# Patient Record
Sex: FEMALE | Race: WHITE | HISPANIC OR LATINO
[De-identification: names, ages, dates, MRNs, and addresses within clinical notes are randomized per-mention and may not be internally consistent; named-entity substitution may affect disease eponyms.]

---

## 2017-07-25 ENCOUNTER — CHARTING TRANS (OUTPATIENT)
Dept: OTHER | Age: 37
End: 2017-07-25

## 2017-08-12 ENCOUNTER — LAB SERVICES (OUTPATIENT)
Dept: OTHER | Age: 37
End: 2017-08-12

## 2017-08-12 ENCOUNTER — CHARTING TRANS (OUTPATIENT)
Dept: FAMILY MEDICINE | Age: 37
End: 2017-08-12

## 2017-08-12 LAB
ALBUMIN SERPL BCG-MCNC: 4.4 G/DL (ref 3.6–5.1)
ALP SERPL-CCNC: 84 U/L (ref 45–105)
ALT SERPL W/O P-5'-P-CCNC: 37 U/L (ref 15–43)
AST SERPL-CCNC: 26 U/L (ref 14–43)
BASOPHIL %: 0.4 % (ref 0–1.2)
BASOPHIL ABSOLUTE #: 0 10*3/UL (ref 0–0.1)
BILIRUB SERPL-MCNC: 0.8 MG/DL (ref 0–1.3)
BILIRUBIN URINE: NEGATIVE
BLOOD URINE: ABNORMAL
BUN SERPL-MCNC: 12 MG/DL (ref 7–20)
CALCIUM SERPL-MCNC: 9.2 MG/DL (ref 8.6–10.6)
CHLORIDE SERPL-SCNC: 99 MMOL/L (ref 96–107)
CHOLEST SERPL-MCNC: 145 MG/DL (ref 140–200)
CLARITY: CLEAR
COLOR: YELLOW
CREATININE, SERUM: 0.7 MG/DL (ref 0.5–1.4)
DIFFERENTIAL TYPE: NORMAL
EOSINOPHIL %: 1.9 % (ref 0–10)
EOSINOPHIL ABSOLUTE #: 0.2 10*3/UL (ref 0–0.5)
FOLLICLE STIMULATING: 26.8 M[IU]/ML (ref 2–25)
GFR SERPL CREATININE-BSD FRML MDRD: >60 ML/MIN/{1.73M2}
GFR SERPL CREATININE-BSD FRML MDRD: >60 ML/MIN/{1.73M2}
GLUCOSE P FAST SERPL-MCNC: 104 MG/DL (ref 60–100)
GLUCOSE QUALITATIVE U: NEGATIVE
HCO3 SERPL-SCNC: 29 MMOL/L (ref 22–32)
HDLC SERPL-MCNC: 36 MG/DL
HEMATOCRIT: 41.1 % (ref 34–45)
HEMOGLOBIN: 14 G/DL (ref 11.2–15.7)
KETONES, URINE: NEGATIVE
LDLC SERPL CALC-MCNC: 88 MG/DL (ref 30–100)
LEUKOCYTE ESTERASE URINE: NEGATIVE
LYMPH PERCENT: 24.3 % (ref 20.5–51.1)
LYMPHOCYTE ABSOLUTE #: 2.2 10*3/UL (ref 1.2–3.4)
MEAN CORPUSCULAR HGB CONCENTRATION: 34.1 % (ref 32–36)
MEAN CORPUSCULAR HGB: 30.8 PG (ref 27–34)
MEAN CORPUSCULAR VOLUME: 90.5 FL (ref 79–95)
MEAN PLATELET VOLUME: 11.8 FL (ref 8.6–12.4)
MONOCYTE ABSOLUTE #: 0.7 10*3/UL (ref 0.2–0.9)
MONOCYTE PERCENT: 7.3 % (ref 4.3–12.9)
NEUTROPHIL ABSOLUTE #: 6 10*3/UL (ref 1.4–6.5)
NEUTROPHIL PERCENT: 66.1 % (ref 34–73.5)
NITRITE URINE: NEGATIVE
PH URINE: 6 (ref 5–7)
PLATELET COUNT: 252 10*3/UL (ref 150–400)
POTASSIUM SERPL-SCNC: 4 MMOL/L (ref 3.5–5.3)
PROLACTIN SERPL-MCNC: 12.1 NG/ML (ref 3–19)
PROT SERPL-MCNC: 8.3 G/DL (ref 6.4–8.5)
RED BLOOD CELL COUNT: 4.54 10*6/UL (ref 3.7–5.2)
RED CELL DISTRIBUTION WIDTH: 13.4 % (ref 11.3–14.8)
SODIUM SERPL-SCNC: 140 MMOL/L (ref 136–146)
SPECIFIC GRAVITY URINE: 1.01 (ref 1–1.03)
TRIGL SERPL-MCNC: 104 MG/DL (ref 0–200)
TSH SERPL DL<=0.05 MIU/L-ACNC: 1.09 M[IU]/L (ref 0.3–4.82)
URINE PROTEIN, QUAL (DIPSTICK): NEGATIVE
UROBILINOGEN URINE: <2
WHITE BLOOD CELL COUNT: 9 10*3/UL (ref 4–10)

## 2017-08-18 ENCOUNTER — CHARTING TRANS (OUTPATIENT)
Dept: FAMILY MEDICINE | Age: 37
End: 2017-08-18

## 2017-08-18 ENCOUNTER — CHARTING TRANS (OUTPATIENT)
Dept: OTHER | Age: 37
End: 2017-08-18

## 2017-09-08 ENCOUNTER — IMAGING SERVICES (OUTPATIENT)
Dept: OTHER | Age: 37
End: 2017-09-08

## 2017-09-14 ENCOUNTER — CHARTING TRANS (OUTPATIENT)
Dept: OTHER | Age: 37
End: 2017-09-14

## 2018-09-06 ENCOUNTER — CHARTING TRANS (OUTPATIENT)
Dept: OTHER | Age: 38
End: 2018-09-06

## 2018-09-07 ENCOUNTER — CHARTING TRANS (OUTPATIENT)
Dept: OTHER | Age: 38
End: 2018-09-07

## 2018-09-14 ENCOUNTER — IMAGING SERVICES (OUTPATIENT)
Dept: OTHER | Age: 38
End: 2018-09-14

## 2018-09-14 ENCOUNTER — CHARTING TRANS (OUTPATIENT)
Dept: OTHER | Age: 38
End: 2018-09-14

## 2018-09-17 ENCOUNTER — MYAURORA ACCOUNT LINK (OUTPATIENT)
Dept: OTHER | Age: 38
End: 2018-09-17

## 2018-09-17 ENCOUNTER — ANCILLARY ORDERS (OUTPATIENT)
Dept: OTHER | Age: 38
End: 2018-09-17

## 2018-09-17 ENCOUNTER — CHARTING TRANS (OUTPATIENT)
Dept: OTHER | Age: 38
End: 2018-09-17

## 2018-09-17 DIAGNOSIS — R92.8 OTHER ABNORMAL AND INCONCLUSIVE FINDINGS ON DIAGNOSTIC IMAGING OF BREAST: ICD-10-CM

## 2018-10-03 ENCOUNTER — CHARTING TRANS (OUTPATIENT)
Dept: OTHER | Age: 38
End: 2018-10-03

## 2018-10-03 ENCOUNTER — MYAURORA ACCOUNT LINK (OUTPATIENT)
Dept: OTHER | Age: 38
End: 2018-10-03

## 2018-10-03 ASSESSMENT — PAIN SCALES - GENERAL: PAINLEVEL_OUTOF10: 0

## 2018-11-03 VITALS
OXYGEN SATURATION: 97 % | BODY MASS INDEX: 42.59 KG/M2 | HEIGHT: 66 IN | TEMPERATURE: 98.3 F | RESPIRATION RATE: 16 BRPM | SYSTOLIC BLOOD PRESSURE: 122 MMHG | DIASTOLIC BLOOD PRESSURE: 80 MMHG | WEIGHT: 264.99 LBS | HEART RATE: 88 BPM

## 2018-11-28 VITALS
BODY MASS INDEX: 41 KG/M2 | SYSTOLIC BLOOD PRESSURE: 122 MMHG | DIASTOLIC BLOOD PRESSURE: 70 MMHG | HEART RATE: 68 BPM | RESPIRATION RATE: 16 BRPM | WEIGHT: 254 LBS | TEMPERATURE: 97.5 F

## 2018-11-28 VITALS
TEMPERATURE: 96.2 F | DIASTOLIC BLOOD PRESSURE: 90 MMHG | RESPIRATION RATE: 20 BRPM | WEIGHT: 257 LBS | SYSTOLIC BLOOD PRESSURE: 132 MMHG | BODY MASS INDEX: 41.3 KG/M2 | HEIGHT: 66 IN | HEART RATE: 76 BPM | OXYGEN SATURATION: 99 %

## 2018-12-03 ENCOUNTER — IMAGING SERVICES (OUTPATIENT)
Dept: MAMMOGRAPHY | Age: 38
End: 2018-12-03

## 2018-12-03 DIAGNOSIS — R92.8 OTHER ABNORMAL AND INCONCLUSIVE FINDINGS ON DIAGNOSTIC IMAGING OF BREAST: ICD-10-CM

## 2019-02-12 RX ORDER — OLMESARTAN MEDOXOMIL AND HYDROCHLOROTHIAZIDE 20/12.5 20; 12.5 MG/1; MG/1
TABLET ORAL
COMMUNITY
End: 2019-10-27 | Stop reason: SDUPTHER

## 2019-02-12 RX ORDER — ALBUTEROL SULFATE 90 UG/1
AEROSOL, METERED RESPIRATORY (INHALATION)
COMMUNITY
Start: 2018-10-09 | End: 2019-03-19 | Stop reason: ALTCHOICE

## 2019-02-12 RX ORDER — PREDNISONE 20 MG/1
TABLET ORAL
COMMUNITY
Start: 2018-10-09 | End: 2019-03-19 | Stop reason: ALTCHOICE

## 2019-02-12 RX ORDER — BENZONATATE 100 MG/1
CAPSULE ORAL
COMMUNITY
Start: 2018-10-09 | End: 2019-03-19 | Stop reason: ALTCHOICE

## 2019-02-12 RX ORDER — DOXYCYCLINE HYCLATE 100 MG
TABLET ORAL
COMMUNITY
Start: 2018-10-09 | End: 2019-03-19 | Stop reason: ALTCHOICE

## 2019-03-01 ENCOUNTER — IMAGING SERVICES (OUTPATIENT)
Dept: MAMMOGRAPHY | Age: 39
End: 2019-03-01

## 2019-03-01 PROCEDURE — 77066 DX MAMMO INCL CAD BI: CPT | Performed by: RADIOLOGY

## 2019-03-01 PROCEDURE — 77062 BREAST TOMOSYNTHESIS BI: CPT | Performed by: RADIOLOGY

## 2019-03-05 VITALS
DIASTOLIC BLOOD PRESSURE: 86 MMHG | HEIGHT: 66 IN | HEART RATE: 72 BPM | TEMPERATURE: 97.6 F | RESPIRATION RATE: 16 BRPM | BODY MASS INDEX: 41.46 KG/M2 | WEIGHT: 258 LBS | SYSTOLIC BLOOD PRESSURE: 128 MMHG

## 2019-03-19 ENCOUNTER — OFFICE VISIT (OUTPATIENT)
Dept: SURGERY | Age: 39
End: 2019-03-19

## 2019-03-19 VITALS — BODY MASS INDEX: 42.59 KG/M2 | WEIGHT: 265 LBS | HEIGHT: 66 IN

## 2019-03-19 DIAGNOSIS — R92.8 ABNORMAL MAMMOGRAM: Primary | ICD-10-CM

## 2019-03-19 PROCEDURE — 99212 OFFICE O/P EST SF 10 MIN: CPT | Performed by: NURSE PRACTITIONER

## 2019-03-19 RX ORDER — OLMESARTAN MEDOXOMIL AND HYDROCHLOROTHIAZIDE 20/12.5 20; 12.5 MG/1; MG/1
TABLET ORAL
COMMUNITY
Start: 2017-08-12 | End: 2019-03-19 | Stop reason: SDUPTHER

## 2019-09-06 ENCOUNTER — OFFICE VISIT (OUTPATIENT)
Dept: FAMILY MEDICINE | Age: 39
End: 2019-09-06

## 2019-09-06 ENCOUNTER — LAB SERVICES (OUTPATIENT)
Dept: FAMILY MEDICINE | Age: 39
End: 2019-09-06

## 2019-09-06 ENCOUNTER — PATIENT OUTREACH (OUTPATIENT)
Dept: FAMILY MEDICINE CLINIC | Facility: CLINIC | Age: 39
End: 2019-09-06

## 2019-09-06 VITALS
TEMPERATURE: 97.8 F | HEART RATE: 74 BPM | DIASTOLIC BLOOD PRESSURE: 90 MMHG | BODY MASS INDEX: 43.55 KG/M2 | RESPIRATION RATE: 18 BRPM | SYSTOLIC BLOOD PRESSURE: 116 MMHG | HEIGHT: 66 IN | WEIGHT: 271 LBS

## 2019-09-06 DIAGNOSIS — R35.89 POLYURIA: Primary | ICD-10-CM

## 2019-09-06 DIAGNOSIS — L02.92 RECURRENT BOILS: ICD-10-CM

## 2019-09-06 DIAGNOSIS — R35.89 POLYURIA: ICD-10-CM

## 2019-09-06 DIAGNOSIS — R63.1 POLYDIPSIA: ICD-10-CM

## 2019-09-06 LAB
ALBUMIN SERPL-MCNC: 3.6 G/DL (ref 3.6–5.1)
ALP SERPL-CCNC: 74 U/L (ref 45–130)
ALT SERPL W/O P-5'-P-CCNC: 27 U/L (ref 4–38)
AST SERPL-CCNC: 22 U/L (ref 14–43)
BASOPHIL %: 0.3 % (ref 0–1.2)
BASOPHIL ABSOLUTE #: 0 10*3/UL (ref 0–0.1)
BILIRUB SERPL-MCNC: 0.4 MG/DL (ref 0–1.3)
BILIRUBIN URINE: NEGATIVE
BLOOD URINE: ABNORMAL
BUN SERPL-MCNC: 14 MG/DL (ref 7–20)
CALCIUM SERPL-MCNC: 8.7 MG/DL (ref 8.6–10.6)
CHLORIDE SERPL-SCNC: 105 MMOL/L (ref 96–107)
CHOLEST SERPL-MCNC: 127 MG/DL (ref 140–200)
CLARITY: ABNORMAL
CO2 SERPL-SCNC: 29 MMOL/L (ref 22–32)
COLOR: YELLOW
CREAT SERPL-MCNC: 0.7 MG/DL (ref 0.5–1.4)
DIFFERENTIAL TYPE: ABNORMAL
EOSINOPHIL %: 1.5 % (ref 0–10)
EOSINOPHIL ABSOLUTE #: 0.1 10*3/UL (ref 0–0.5)
EST. AVERAGE GLUCOSE BLD GHB EST-MCNC: 151 MG/DL (ref 0–154)
GFR SERPL CREATININE-BSD FRML MDRD: >60 ML/MIN/{1.73M2}
GFR SERPL CREATININE-BSD FRML MDRD: >60 ML/MIN/{1.73M2}
GLUCOSE P FAST SERPL-MCNC: 123 MG/DL (ref 60–100)
GLUCOSE QUALITATIVE U: NEGATIVE
HBA1C MFR BLD: 6.9 % (ref 4.2–6)
HDLC SERPL-MCNC: 37 MG/DL
HEMATOCRIT: 42.8 % (ref 34–45)
HEMOGLOBIN: 13.1 G/DL (ref 11.2–15.7)
KETONES, URINE: NEGATIVE
LDLC SERPL CALC-MCNC: 65 MG/DL (ref 30–100)
LEUKOCYTE ESTERASE URINE: ABNORMAL
LYMPH PERCENT: 29.1 % (ref 20.5–51.1)
LYMPHOCYTE ABSOLUTE #: 2.3 10*3/UL (ref 1.2–3.4)
MEAN CORPUSCULAR HGB CONCENTRATION: 30.6 % (ref 32–36)
MEAN CORPUSCULAR HGB: 28.8 PG (ref 27–34)
MEAN CORPUSCULAR VOLUME: 94.1 FL (ref 79–95)
MEAN PLATELET VOLUME: 11.9 FL (ref 8.6–12.4)
MONOCYTE ABSOLUTE #: 0.5 10*3/UL (ref 0.2–0.9)
MONOCYTE PERCENT: 6.6 % (ref 4.3–12.9)
MUCOUS: ABNORMAL
NEUTROPHIL ABSOLUTE #: 4.9 10*3/UL (ref 1.4–6.5)
NEUTROPHIL PERCENT: 62.5 % (ref 34–73.5)
NITRITE URINE: NEGATIVE
PH URINE: 6 (ref 5–7)
PLATELET COUNT: 229 10*3/UL (ref 150–400)
POTASSIUM SERPL-SCNC: 4.5 MMOL/L (ref 3.5–5.3)
PROT SERPL-MCNC: 7.1 G/DL (ref 6.4–8.5)
RED BLOOD CELL COUNT: 4.55 10*6/UL (ref 3.7–5.2)
RED BLOOD CELLS URINE: ABNORMAL (ref 0–3)
RED CELL DISTRIBUTION WIDTH: 13.2 % (ref 11.3–14.8)
SODIUM SERPL-SCNC: 142 MMOL/L (ref 136–146)
SPECIFIC GRAVITY URINE: 1.03 (ref 1–1.03)
SQUAMOUS EPITHELIAL CELLS: ABNORMAL
TRIGL SERPL-MCNC: 126 MG/DL (ref 0–200)
URINE PROTEIN, QUAL (DIPSTICK): NEGATIVE
UROBILINOGEN URINE: <2
WHITE BLOOD CELL COUNT: 7.9 10*3/UL (ref 4–10)
WHITE BLOOD CELLS URINE: ABNORMAL (ref 0–5)

## 2019-09-06 PROCEDURE — 99214 OFFICE O/P EST MOD 30 MIN: CPT | Performed by: PHYSICIAN ASSISTANT

## 2019-09-06 PROCEDURE — 85025 COMPLETE CBC W/AUTO DIFF WBC: CPT | Performed by: PHYSICIAN ASSISTANT

## 2019-09-06 PROCEDURE — 80061 LIPID PANEL: CPT | Performed by: PHYSICIAN ASSISTANT

## 2019-09-06 PROCEDURE — 87086 URINE CULTURE/COLONY COUNT: CPT | Performed by: PHYSICIAN ASSISTANT

## 2019-09-06 PROCEDURE — 80053 COMPREHEN METABOLIC PANEL: CPT | Performed by: PHYSICIAN ASSISTANT

## 2019-09-06 PROCEDURE — 83036 HEMOGLOBIN GLYCOSYLATED A1C: CPT | Performed by: PHYSICIAN ASSISTANT

## 2019-09-06 PROCEDURE — 81001 URINALYSIS AUTO W/SCOPE: CPT | Performed by: PHYSICIAN ASSISTANT

## 2019-09-06 PROCEDURE — 36415 COLL VENOUS BLD VENIPUNCTURE: CPT | Performed by: PHYSICIAN ASSISTANT

## 2019-09-06 RX ORDER — CEFADROXIL 1000 MG/1
1 TABLET ORAL 2 TIMES DAILY
Qty: 28 TABLET | Refills: 0 | Status: SHIPPED | OUTPATIENT
Start: 2019-09-06 | End: 2020-01-25 | Stop reason: ALTCHOICE

## 2019-09-07 ENCOUNTER — E-ADVICE (OUTPATIENT)
Dept: FAMILY MEDICINE | Age: 39
End: 2019-09-07

## 2019-09-07 ENCOUNTER — TELEPHONE (OUTPATIENT)
Dept: FAMILY MEDICINE | Age: 39
End: 2019-09-07

## 2019-09-07 LAB — FINAL REPORT: NORMAL

## 2019-09-07 RX ORDER — METFORMIN HYDROCHLORIDE 500 MG/1
500 TABLET, EXTENDED RELEASE ORAL
Qty: 30 TABLET | Refills: 1 | Status: SHIPPED | OUTPATIENT
Start: 2019-09-07 | End: 2019-10-27 | Stop reason: SDUPTHER

## 2019-09-09 ENCOUNTER — OFFICE VISIT (OUTPATIENT)
Dept: FAMILY MEDICINE | Age: 39
End: 2019-09-09

## 2019-09-09 VITALS
DIASTOLIC BLOOD PRESSURE: 82 MMHG | SYSTOLIC BLOOD PRESSURE: 112 MMHG | HEART RATE: 84 BPM | BODY MASS INDEX: 42.59 KG/M2 | WEIGHT: 265 LBS | RESPIRATION RATE: 16 BRPM | TEMPERATURE: 97.8 F | HEIGHT: 66 IN

## 2019-09-09 DIAGNOSIS — E11.9 TYPE 2 DIABETES MELLITUS WITHOUT COMPLICATION, WITHOUT LONG-TERM CURRENT USE OF INSULIN (CMD): Primary | ICD-10-CM

## 2019-09-09 PROCEDURE — 3074F SYST BP LT 130 MM HG: CPT | Performed by: PHYSICIAN ASSISTANT

## 2019-09-09 PROCEDURE — 3079F DIAST BP 80-89 MM HG: CPT | Performed by: PHYSICIAN ASSISTANT

## 2019-09-09 PROCEDURE — 99214 OFFICE O/P EST MOD 30 MIN: CPT | Performed by: PHYSICIAN ASSISTANT

## 2019-09-14 ASSESSMENT — ENCOUNTER SYMPTOMS
RESPIRATORY NEGATIVE: 1
DIZZINESS: 1
CONSTITUTIONAL NEGATIVE: 1

## 2019-09-15 ASSESSMENT — ENCOUNTER SYMPTOMS
RESPIRATORY NEGATIVE: 1
CONSTITUTIONAL NEGATIVE: 1

## 2019-10-01 ENCOUNTER — E-ADVICE (OUTPATIENT)
Dept: SURGERY | Age: 39
End: 2019-10-01

## 2019-10-05 ENCOUNTER — OFFICE VISIT (OUTPATIENT)
Dept: FAMILY MEDICINE | Age: 39
End: 2019-10-05

## 2019-10-05 VITALS
WEIGHT: 256 LBS | TEMPERATURE: 97.2 F | BODY MASS INDEX: 41.14 KG/M2 | SYSTOLIC BLOOD PRESSURE: 128 MMHG | HEIGHT: 66 IN | RESPIRATION RATE: 16 BRPM | HEART RATE: 74 BPM | DIASTOLIC BLOOD PRESSURE: 84 MMHG | OXYGEN SATURATION: 97 %

## 2019-10-05 DIAGNOSIS — E11.9 TYPE 2 DIABETES MELLITUS WITHOUT COMPLICATION, WITHOUT LONG-TERM CURRENT USE OF INSULIN (CMD): Primary | ICD-10-CM

## 2019-10-05 DIAGNOSIS — Z23 NEED FOR PNEUMOCOCCAL VACCINATION: ICD-10-CM

## 2019-10-05 DIAGNOSIS — Z23 NEED FOR INFLUENZA VACCINATION: ICD-10-CM

## 2019-10-05 PROCEDURE — 3079F DIAST BP 80-89 MM HG: CPT | Performed by: PHYSICIAN ASSISTANT

## 2019-10-05 PROCEDURE — 3074F SYST BP LT 130 MM HG: CPT | Performed by: PHYSICIAN ASSISTANT

## 2019-10-05 PROCEDURE — 99214 OFFICE O/P EST MOD 30 MIN: CPT | Performed by: PHYSICIAN ASSISTANT

## 2019-10-05 PROCEDURE — 90471 IMMUNIZATION ADMIN: CPT

## 2019-10-05 PROCEDURE — 90732 PPSV23 VACC 2 YRS+ SUBQ/IM: CPT

## 2019-10-05 PROCEDURE — 90686 IIV4 VACC NO PRSV 0.5 ML IM: CPT

## 2019-10-05 PROCEDURE — 90472 IMMUNIZATION ADMIN EACH ADD: CPT

## 2019-10-12 ENCOUNTER — OFFICE VISIT (OUTPATIENT)
Dept: NUTRITION | Age: 39
End: 2019-10-12

## 2019-10-12 DIAGNOSIS — E11.9 TYPE 2 DIABETES MELLITUS WITHOUT COMPLICATION, WITHOUT LONG-TERM CURRENT USE OF INSULIN (CMD): Primary | ICD-10-CM

## 2019-10-12 PROCEDURE — 97802 MEDICAL NUTRITION INDIV IN: CPT | Performed by: DIETITIAN, REGISTERED

## 2019-10-28 ENCOUNTER — OFFICE VISIT (OUTPATIENT)
Dept: OPTOMETRY | Age: 39
End: 2019-10-28
Attending: PHYSICIAN ASSISTANT

## 2019-10-28 ENCOUNTER — IMAGING SERVICES (OUTPATIENT)
Dept: MAMMOGRAPHY | Age: 39
End: 2019-10-28
Attending: NURSE PRACTITIONER

## 2019-10-28 DIAGNOSIS — R92.8 ABNORMAL MAMMOGRAM: ICD-10-CM

## 2019-10-28 DIAGNOSIS — R92.8 ABNORMAL MAMMOGRAM: Primary | ICD-10-CM

## 2019-10-28 DIAGNOSIS — E11.9 TYPE 2 DIABETES MELLITUS WITHOUT RETINOPATHY (CMD): Primary | ICD-10-CM

## 2019-10-28 PROCEDURE — G0279 TOMOSYNTHESIS, MAMMO: HCPCS | Performed by: RADIOLOGY

## 2019-10-28 PROCEDURE — 77061 BREAST TOMOSYNTHESIS UNI: CPT | Performed by: RADIOLOGY

## 2019-10-28 PROCEDURE — 77065 DX MAMMO INCL CAD UNI: CPT | Performed by: RADIOLOGY

## 2019-10-28 PROCEDURE — 99203 OFFICE O/P NEW LOW 30 MIN: CPT | Performed by: OPTOMETRIST

## 2019-10-28 RX ORDER — METFORMIN HYDROCHLORIDE 500 MG/1
TABLET, EXTENDED RELEASE ORAL
Qty: 30 TABLET | Refills: 1 | Status: SHIPPED | OUTPATIENT
Start: 2019-10-28 | End: 2020-01-18 | Stop reason: SDUPTHER

## 2019-10-28 RX ORDER — OLMESARTAN MEDOXOMIL AND HYDROCHLOROTHIAZIDE 20/12.5 20; 12.5 MG/1; MG/1
TABLET ORAL
Qty: 30 TABLET | Refills: 11 | Status: SHIPPED | OUTPATIENT
Start: 2019-10-28 | End: 2021-02-23 | Stop reason: SDUPTHER

## 2019-10-28 RX ORDER — LANCETS
EACH MISCELLANEOUS
Refills: 5 | COMMUNITY
Start: 2019-09-09

## 2019-10-28 ASSESSMENT — VISUAL ACUITY
OS_SC: J1+
OD_SC: J1+
OS_SC: 20/20
OD_SC: 20/20
METHOD: SNELLEN - LINEAR

## 2019-10-28 ASSESSMENT — EXTERNAL EXAM - RIGHT EYE: OD_EXAM: NORMAL

## 2019-10-28 ASSESSMENT — CONF VISUAL FIELD
OS_NORMAL: 1
OD_NORMAL: 1

## 2019-10-28 ASSESSMENT — SLIT LAMP EXAM - LIDS
COMMENTS: NORMAL
COMMENTS: NORMAL

## 2019-10-28 ASSESSMENT — EXTERNAL EXAM - LEFT EYE: OS_EXAM: NORMAL

## 2019-10-28 ASSESSMENT — CUP TO DISC RATIO
OD_RATIO: 0.2
OS_RATIO: 0.2

## 2019-10-29 ENCOUNTER — PHARMACIST CLINIC VISIT (OUTPATIENT)
Dept: CHRONIC DISEASE MANAGEMENT | Age: 39
End: 2019-10-29

## 2019-10-29 DIAGNOSIS — Z71.89 ENCOUNTER FOR DIABETES EDUCATION: Primary | ICD-10-CM

## 2019-10-29 PROCEDURE — 99211 OFF/OP EST MAY X REQ PHY/QHP: CPT

## 2019-12-10 ENCOUNTER — PATIENT OUTREACH (OUTPATIENT)
Dept: FAMILY MEDICINE CLINIC | Facility: CLINIC | Age: 39
End: 2019-12-10

## 2020-01-11 ENCOUNTER — APPOINTMENT (OUTPATIENT)
Dept: FAMILY MEDICINE | Age: 40
End: 2020-01-11

## 2020-01-20 RX ORDER — METFORMIN HYDROCHLORIDE 500 MG/1
TABLET, EXTENDED RELEASE ORAL
Qty: 30 TABLET | Refills: 0 | Status: SHIPPED | OUTPATIENT
Start: 2020-01-20 | End: 2020-01-25 | Stop reason: SDUPTHER

## 2020-01-25 ENCOUNTER — APPOINTMENT (OUTPATIENT)
Dept: FAMILY MEDICINE | Age: 40
End: 2020-01-25

## 2020-01-25 ENCOUNTER — OFFICE VISIT (OUTPATIENT)
Dept: FAMILY MEDICINE | Age: 40
End: 2020-01-25

## 2020-01-25 ENCOUNTER — LAB SERVICES (OUTPATIENT)
Dept: FAMILY MEDICINE | Age: 40
End: 2020-01-25

## 2020-01-25 VITALS
WEIGHT: 241 LBS | RESPIRATION RATE: 16 BRPM | HEIGHT: 66 IN | SYSTOLIC BLOOD PRESSURE: 108 MMHG | OXYGEN SATURATION: 98 % | TEMPERATURE: 97.8 F | DIASTOLIC BLOOD PRESSURE: 76 MMHG | HEART RATE: 70 BPM | BODY MASS INDEX: 38.73 KG/M2

## 2020-01-25 DIAGNOSIS — E11.9 TYPE 2 DIABETES MELLITUS WITHOUT COMPLICATION, WITHOUT LONG-TERM CURRENT USE OF INSULIN (CMD): ICD-10-CM

## 2020-01-25 DIAGNOSIS — Z00.01 ENCOUNTER FOR GENERAL ADULT MEDICAL EXAMINATION WITH ABNORMAL FINDINGS: Primary | ICD-10-CM

## 2020-01-25 DIAGNOSIS — Z12.39 BREAST CANCER SCREENING: ICD-10-CM

## 2020-01-25 DIAGNOSIS — Z11.51 SCREENING FOR HPV (HUMAN PAPILLOMAVIRUS): ICD-10-CM

## 2020-01-25 DIAGNOSIS — Z12.4 PAP SMEAR FOR CERVICAL CANCER SCREENING: ICD-10-CM

## 2020-01-25 LAB
ALBUMIN SERPL-MCNC: 4.6 G/DL (ref 3.6–5.1)
ALP SERPL-CCNC: 67 U/L (ref 45–130)
ALT SERPL W/O P-5'-P-CCNC: 30 U/L (ref 4–38)
AST SERPL-CCNC: 26 U/L (ref 14–43)
BILIRUB SERPL-MCNC: 0.6 MG/DL (ref 0–1.3)
BUN SERPL-MCNC: 18 MG/DL (ref 7–20)
CALCIUM SERPL-MCNC: 9.6 MG/DL (ref 8.6–10.6)
CHLORIDE SERPL-SCNC: 98 MMOL/L (ref 96–107)
CO2 SERPL-SCNC: 31 MMOL/L (ref 22–32)
CREAT SERPL-MCNC: 0.7 MG/DL (ref 0.5–1.4)
EST. AVERAGE GLUCOSE BLD GHB EST-MCNC: 130 MG/DL (ref 0–154)
GFR SERPL CREATININE-BSD FRML MDRD: >60 ML/MIN/{1.73M2}
GFR SERPL CREATININE-BSD FRML MDRD: >60 ML/MIN/{1.73M2}
GLUCOSE P FAST SERPL-MCNC: 101 MG/DL (ref 60–100)
HBA1C MFR BLD: 6.2 % (ref 4.2–6)
POTASSIUM SERPL-SCNC: 4.2 MMOL/L (ref 3.5–5.3)
PROT SERPL-MCNC: 8.2 G/DL (ref 6.4–8.5)
SODIUM SERPL-SCNC: 136 MMOL/L (ref 136–146)

## 2020-01-25 PROCEDURE — 83036 HEMOGLOBIN GLYCOSYLATED A1C: CPT | Performed by: PHYSICIAN ASSISTANT

## 2020-01-25 PROCEDURE — 99214 OFFICE O/P EST MOD 30 MIN: CPT | Performed by: PHYSICIAN ASSISTANT

## 2020-01-25 PROCEDURE — 88142 CYTOPATH C/V THIN LAYER: CPT | Performed by: PATHOLOGY

## 2020-01-25 PROCEDURE — 3074F SYST BP LT 130 MM HG: CPT | Performed by: PHYSICIAN ASSISTANT

## 2020-01-25 PROCEDURE — 99395 PREV VISIT EST AGE 18-39: CPT | Performed by: PHYSICIAN ASSISTANT

## 2020-01-25 PROCEDURE — 36415 COLL VENOUS BLD VENIPUNCTURE: CPT | Performed by: PHYSICIAN ASSISTANT

## 2020-01-25 PROCEDURE — 3078F DIAST BP <80 MM HG: CPT | Performed by: PHYSICIAN ASSISTANT

## 2020-01-25 PROCEDURE — X1094 NO CHARGE VISIT: HCPCS

## 2020-01-25 PROCEDURE — 80053 COMPREHEN METABOLIC PANEL: CPT | Performed by: PHYSICIAN ASSISTANT

## 2020-01-25 RX ORDER — METFORMIN HYDROCHLORIDE 500 MG/1
500 TABLET, EXTENDED RELEASE ORAL
Qty: 30 TABLET | Refills: 5 | Status: SHIPPED | OUTPATIENT
Start: 2020-01-25 | End: 2020-11-05

## 2020-01-25 RX ORDER — DROSPIRENONE AND ETHINYL ESTRADIOL 0.03MG-3MG
1 KIT ORAL DAILY
Qty: 84 TABLET | Refills: 4 | Status: SHIPPED | OUTPATIENT
Start: 2020-01-25 | End: 2020-06-05 | Stop reason: ALTCHOICE

## 2020-01-25 ASSESSMENT — ENCOUNTER SYMPTOMS
EYE ITCHING: 0
DIZZINESS: 0
ADENOPATHY: 0
NERVOUS/ANXIOUS: 1
AGITATION: 0
SORE THROAT: 0
DIAPHORESIS: 0
EYE DISCHARGE: 0
NUMBNESS: 0
FACIAL SWELLING: 0
FATIGUE: 0
FACIAL ASYMMETRY: 0
CHILLS: 0
ACTIVITY CHANGE: 0
CHEST TIGHTNESS: 0
SINUS PRESSURE: 0
FEVER: 0
CONFUSION: 0
EYE REDNESS: 0
APPETITE CHANGE: 0
NAUSEA: 0
EYE PAIN: 0
POLYPHAGIA: 0
RHINORRHEA: 0
POLYDIPSIA: 0
HEADACHES: 0
ABDOMINAL DISTENTION: 0
BRUISES/BLEEDS EASILY: 0
CONSTIPATION: 0
SHORTNESS OF BREATH: 0
VOMITING: 0
WHEEZING: 0
WEAKNESS: 0
COUGH: 0
COLOR CHANGE: 0
BLOOD IN STOOL: 0
ABDOMINAL PAIN: 0
DIARRHEA: 0
UNEXPECTED WEIGHT CHANGE: 0
SINUS PAIN: 0
LIGHT-HEADEDNESS: 0

## 2020-01-25 ASSESSMENT — PATIENT HEALTH QUESTIONNAIRE - PHQ9
2. FEELING DOWN, DEPRESSED OR HOPELESS: NOT AT ALL
1. LITTLE INTEREST OR PLEASURE IN DOING THINGS: NOT AT ALL
SUM OF ALL RESPONSES TO PHQ9 QUESTIONS 1 AND 2: 0
SUM OF ALL RESPONSES TO PHQ9 QUESTIONS 1 AND 2: 0

## 2020-01-30 LAB — AP REPORT: NORMAL

## 2020-02-04 LAB — HPV I/H RISK 4 DNA CVX QL NAA+PROBE: NORMAL

## 2020-06-02 ENCOUNTER — V-VISIT (OUTPATIENT)
Dept: FAMILY MEDICINE | Age: 40
End: 2020-06-02

## 2020-06-02 ENCOUNTER — E-ADVICE (OUTPATIENT)
Dept: FAMILY MEDICINE | Age: 40
End: 2020-06-02

## 2020-06-02 DIAGNOSIS — F41.9 ANXIETY: Primary | ICD-10-CM

## 2020-06-02 PROBLEM — I10 BENIGN ESSENTIAL HYPERTENSION: Status: ACTIVE | Noted: 2017-07-25

## 2020-06-02 PROBLEM — E11.9 TYPE 2 DIABETES MELLITUS (CMD): Status: ACTIVE | Noted: 2019-12-22

## 2020-06-02 PROCEDURE — 99214 OFFICE O/P EST MOD 30 MIN: CPT | Performed by: PHYSICIAN ASSISTANT

## 2020-06-02 RX ORDER — BUSPIRONE HYDROCHLORIDE 5 MG/1
5 TABLET ORAL 2 TIMES DAILY
Qty: 60 TABLET | Refills: 2 | Status: SHIPPED | OUTPATIENT
Start: 2020-06-02 | End: 2021-04-03 | Stop reason: ALTCHOICE

## 2020-06-02 RX ORDER — ALPRAZOLAM 0.25 MG/1
0.25 TABLET ORAL 3 TIMES DAILY PRN
Qty: 30 TABLET | Refills: 0 | Status: SHIPPED | OUTPATIENT
Start: 2020-06-02 | End: 2021-04-03 | Stop reason: ALTCHOICE

## 2020-06-02 ASSESSMENT — ENCOUNTER SYMPTOMS
CONSTITUTIONAL NEGATIVE: 1
RESPIRATORY NEGATIVE: 1
NERVOUS/ANXIOUS: 1

## 2020-06-05 ENCOUNTER — OFFICE VISIT (OUTPATIENT)
Dept: FAMILY MEDICINE | Age: 40
End: 2020-06-05

## 2020-06-05 ENCOUNTER — LAB SERVICES (OUTPATIENT)
Dept: FAMILY MEDICINE | Age: 40
End: 2020-06-05

## 2020-06-05 VITALS
HEIGHT: 66 IN | BODY MASS INDEX: 39.7 KG/M2 | HEART RATE: 76 BPM | DIASTOLIC BLOOD PRESSURE: 76 MMHG | RESPIRATION RATE: 18 BRPM | OXYGEN SATURATION: 97 % | SYSTOLIC BLOOD PRESSURE: 102 MMHG | TEMPERATURE: 97.4 F | WEIGHT: 247 LBS

## 2020-06-05 DIAGNOSIS — R00.2 HEART PALPITATIONS: Primary | ICD-10-CM

## 2020-06-05 DIAGNOSIS — R00.2 HEART PALPITATIONS: ICD-10-CM

## 2020-06-05 DIAGNOSIS — F41.9 ANXIETY: ICD-10-CM

## 2020-06-05 LAB — TSH SERPL DL<=0.05 MIU/L-ACNC: 1.66 M[IU]/L (ref 0.3–4.82)

## 2020-06-05 PROCEDURE — 99214 OFFICE O/P EST MOD 30 MIN: CPT | Performed by: PHYSICIAN ASSISTANT

## 2020-06-05 PROCEDURE — 3074F SYST BP LT 130 MM HG: CPT | Performed by: PHYSICIAN ASSISTANT

## 2020-06-05 PROCEDURE — 84443 ASSAY THYROID STIM HORMONE: CPT | Performed by: PHYSICIAN ASSISTANT

## 2020-06-05 PROCEDURE — 93000 ELECTROCARDIOGRAM COMPLETE: CPT | Performed by: PHYSICIAN ASSISTANT

## 2020-06-05 PROCEDURE — 3078F DIAST BP <80 MM HG: CPT | Performed by: PHYSICIAN ASSISTANT

## 2020-06-05 PROCEDURE — 36415 COLL VENOUS BLD VENIPUNCTURE: CPT | Performed by: PHYSICIAN ASSISTANT

## 2020-06-05 ASSESSMENT — PATIENT HEALTH QUESTIONNAIRE - PHQ9
CLINICAL INTERPRETATION OF PHQ2 SCORE: NO FURTHER SCREENING NEEDED
2. FEELING DOWN, DEPRESSED OR HOPELESS: NOT AT ALL
1. LITTLE INTEREST OR PLEASURE IN DOING THINGS: NOT AT ALL
SUM OF ALL RESPONSES TO PHQ9 QUESTIONS 1 AND 2: 0
SUM OF ALL RESPONSES TO PHQ9 QUESTIONS 1 AND 2: 0
CLINICAL INTERPRETATION OF PHQ9 SCORE: NO FURTHER SCREENING NEEDED

## 2020-06-05 ASSESSMENT — ENCOUNTER SYMPTOMS
RESPIRATORY NEGATIVE: 1
CONSTITUTIONAL NEGATIVE: 1
NERVOUS/ANXIOUS: 1

## 2020-07-24 ENCOUNTER — V-VISIT (OUTPATIENT)
Dept: FAMILY MEDICINE | Age: 40
End: 2020-07-24

## 2020-07-24 DIAGNOSIS — R00.2 HEART PALPITATIONS: ICD-10-CM

## 2020-07-24 DIAGNOSIS — F41.9 ANXIETY: Primary | ICD-10-CM

## 2020-07-24 DIAGNOSIS — R07.89 CHEST PRESSURE: ICD-10-CM

## 2020-07-24 PROCEDURE — 99214 OFFICE O/P EST MOD 30 MIN: CPT | Performed by: PHYSICIAN ASSISTANT

## 2020-07-24 ASSESSMENT — ENCOUNTER SYMPTOMS
CONSTITUTIONAL NEGATIVE: 1
RESPIRATORY NEGATIVE: 1

## 2020-07-25 ENCOUNTER — NURSE ONLY (OUTPATIENT)
Dept: FAMILY MEDICINE | Age: 40
End: 2020-07-25

## 2020-07-25 DIAGNOSIS — R07.89 CHEST PRESSURE: ICD-10-CM

## 2020-07-25 DIAGNOSIS — F41.9 ANXIETY: ICD-10-CM

## 2020-07-25 DIAGNOSIS — Z71.9 COUNSELED BY NURSE: Primary | ICD-10-CM

## 2020-07-25 DIAGNOSIS — R00.2 HEART PALPITATIONS: ICD-10-CM

## 2020-07-25 PROCEDURE — 93000 ELECTROCARDIOGRAM COMPLETE: CPT | Performed by: PHYSICIAN ASSISTANT

## 2020-08-12 ENCOUNTER — ANCILLARY PROCEDURE (OUTPATIENT)
Dept: CARDIOLOGY | Age: 40
End: 2020-08-12
Attending: PHYSICIAN ASSISTANT

## 2020-08-12 DIAGNOSIS — R00.2 HEART PALPITATIONS: ICD-10-CM

## 2020-08-12 PROCEDURE — 93224 XTRNL ECG REC UP TO 48 HRS: CPT | Performed by: INTERNAL MEDICINE

## 2020-08-25 ENCOUNTER — E-ADVICE (OUTPATIENT)
Dept: FAMILY MEDICINE | Age: 40
End: 2020-08-25

## 2020-10-22 ENCOUNTER — EXTERNAL RECORD (OUTPATIENT)
Dept: HEALTH INFORMATION MANAGEMENT | Facility: OTHER | Age: 40
End: 2020-10-22

## 2020-10-24 ENCOUNTER — EXTERNAL RECORD (OUTPATIENT)
Dept: HEALTH INFORMATION MANAGEMENT | Facility: OTHER | Age: 40
End: 2020-10-24

## 2020-11-05 RX ORDER — METFORMIN HYDROCHLORIDE 500 MG/1
TABLET, EXTENDED RELEASE ORAL
Qty: 30 TABLET | Refills: 3 | Status: SHIPPED | OUTPATIENT
Start: 2020-11-05 | End: 2021-02-23 | Stop reason: SDUPTHER

## 2021-02-23 ENCOUNTER — E-ADVICE (OUTPATIENT)
Dept: FAMILY MEDICINE | Age: 41
End: 2021-02-23

## 2021-02-23 DIAGNOSIS — E11.69 TYPE 2 DIABETES MELLITUS WITH OTHER SPECIFIED COMPLICATION, UNSPECIFIED WHETHER LONG TERM INSULIN USE (CMD): Primary | ICD-10-CM

## 2021-02-23 RX ORDER — OLMESARTAN MEDOXOMIL AND HYDROCHLOROTHIAZIDE 20/12.5 20; 12.5 MG/1; MG/1
1 TABLET ORAL DAILY
Qty: 90 TABLET | Refills: 1 | Status: SHIPPED | OUTPATIENT
Start: 2021-02-23 | End: 2021-08-10 | Stop reason: SDUPTHER

## 2021-02-23 RX ORDER — METFORMIN HYDROCHLORIDE 500 MG/1
500 TABLET, EXTENDED RELEASE ORAL
Qty: 90 TABLET | Refills: 1 | Status: SHIPPED | OUTPATIENT
Start: 2021-02-23 | End: 2021-04-03 | Stop reason: SDUPTHER

## 2021-03-29 LAB — HBA1C MFR BLD: 6.9 %

## 2021-04-03 ENCOUNTER — OFFICE VISIT (OUTPATIENT)
Dept: FAMILY MEDICINE | Age: 41
End: 2021-04-03

## 2021-04-03 VITALS
BODY MASS INDEX: 44.68 KG/M2 | WEIGHT: 278 LBS | TEMPERATURE: 97.6 F | HEIGHT: 66 IN | SYSTOLIC BLOOD PRESSURE: 132 MMHG | RESPIRATION RATE: 16 BRPM | OXYGEN SATURATION: 99 % | HEART RATE: 87 BPM | DIASTOLIC BLOOD PRESSURE: 88 MMHG

## 2021-04-03 DIAGNOSIS — Z12.31 VISIT FOR SCREENING MAMMOGRAM: ICD-10-CM

## 2021-04-03 DIAGNOSIS — E11.69 TYPE 2 DIABETES MELLITUS WITH OTHER SPECIFIED COMPLICATION, UNSPECIFIED WHETHER LONG TERM INSULIN USE (CMD): ICD-10-CM

## 2021-04-03 DIAGNOSIS — R10.33 PERIUMBILICAL ABDOMINAL PAIN: ICD-10-CM

## 2021-04-03 DIAGNOSIS — N64.4 BREAST PAIN: ICD-10-CM

## 2021-04-03 DIAGNOSIS — E66.01 CLASS 3 SEVERE OBESITY DUE TO EXCESS CALORIES WITH SERIOUS COMORBIDITY AND BODY MASS INDEX (BMI) OF 40.0 TO 44.9 IN ADULT (CMD): ICD-10-CM

## 2021-04-03 DIAGNOSIS — Z00.01 ENCOUNTER FOR GENERAL ADULT MEDICAL EXAMINATION WITH ABNORMAL FINDINGS: Primary | ICD-10-CM

## 2021-04-03 LAB
BILIRUBIN URINE: NEGATIVE
BLOOD URINE: ABNORMAL
CLARITY: ABNORMAL
COLOR: YELLOW
GLUCOSE QUALITATIVE U: NEGATIVE
KETONES, URINE: NEGATIVE
LEUKOCYTE ESTERASE URINE: ABNORMAL
MUCOUS: ABNORMAL
NITRITE URINE: NEGATIVE
PH URINE: 6 (ref 5–7)
RED BLOOD CELLS URINE: ABNORMAL (ref 0–3)
SPECIFIC GRAVITY URINE: 1.03 (ref 1–1.03)
SQUAMOUS EPITHELIAL CELLS: ABNORMAL
URINE PROTEIN, QUAL (DIPSTICK): NEGATIVE
UROBILINOGEN URINE: <2
WHITE BLOOD CELLS URINE: ABNORMAL (ref 0–5)

## 2021-04-03 PROCEDURE — 87086 URINE CULTURE/COLONY COUNT: CPT | Performed by: PHYSICIAN ASSISTANT

## 2021-04-03 PROCEDURE — 3075F SYST BP GE 130 - 139MM HG: CPT | Performed by: PHYSICIAN ASSISTANT

## 2021-04-03 PROCEDURE — 99213 OFFICE O/P EST LOW 20 MIN: CPT | Performed by: PHYSICIAN ASSISTANT

## 2021-04-03 PROCEDURE — 81003 URINALYSIS AUTO W/O SCOPE: CPT | Performed by: PHYSICIAN ASSISTANT

## 2021-04-03 PROCEDURE — 99395 PREV VISIT EST AGE 18-39: CPT | Performed by: PHYSICIAN ASSISTANT

## 2021-04-03 PROCEDURE — 3079F DIAST BP 80-89 MM HG: CPT | Performed by: PHYSICIAN ASSISTANT

## 2021-04-03 RX ORDER — METFORMIN HYDROCHLORIDE 500 MG/1
500 TABLET, EXTENDED RELEASE ORAL
Qty: 90 TABLET | Refills: 1 | Status: SHIPPED | OUTPATIENT
Start: 2021-04-03 | End: 2021-08-10 | Stop reason: SDUPTHER

## 2021-04-03 ASSESSMENT — ENCOUNTER SYMPTOMS
AGITATION: 0
NUMBNESS: 1
WHEEZING: 0
ACTIVITY CHANGE: 0
DIZZINESS: 0
SHORTNESS OF BREATH: 0
DIARRHEA: 0
SINUS PRESSURE: 0
FEVER: 0
HEADACHES: 0
RHINORRHEA: 0
BRUISES/BLEEDS EASILY: 0
WEAKNESS: 0
CONFUSION: 0
POLYPHAGIA: 0
EYE REDNESS: 0
BLOOD IN STOOL: 0
CHEST TIGHTNESS: 0
ABDOMINAL PAIN: 1
EYE ITCHING: 0
FATIGUE: 0
ADENOPATHY: 0
COLOR CHANGE: 0
SINUS PAIN: 0
NAUSEA: 0
VOMITING: 0
ABDOMINAL DISTENTION: 0
SORE THROAT: 0
FACIAL ASYMMETRY: 0
DIAPHORESIS: 0
CHILLS: 0
UNEXPECTED WEIGHT CHANGE: 0
EYE PAIN: 0
POLYDIPSIA: 0
COUGH: 0
CONSTIPATION: 0
FACIAL SWELLING: 0
LIGHT-HEADEDNESS: 0
EYE DISCHARGE: 0
APPETITE CHANGE: 0

## 2021-04-05 DIAGNOSIS — R82.90 ABNORMAL URINALYSIS: Primary | ICD-10-CM

## 2021-04-05 LAB — FINAL REPORT: NORMAL

## 2021-05-17 ENCOUNTER — DOCUMENTATION (OUTPATIENT)
Dept: FAMILY MEDICINE | Age: 41
End: 2021-05-17

## 2021-06-23 ENCOUNTER — E-ADVICE (OUTPATIENT)
Dept: FAMILY MEDICINE | Age: 41
End: 2021-06-23

## 2021-06-25 ENCOUNTER — APPOINTMENT (OUTPATIENT)
Dept: FAMILY MEDICINE | Age: 41
End: 2021-06-25

## 2021-06-25 ENCOUNTER — APPOINTMENT (OUTPATIENT)
Dept: MAMMOGRAPHY | Age: 41
End: 2021-06-25
Attending: PHYSICIAN ASSISTANT

## 2021-08-10 ENCOUNTER — LAB SERVICES (OUTPATIENT)
Dept: FAMILY MEDICINE | Age: 41
End: 2021-08-10

## 2021-08-10 ENCOUNTER — OFFICE VISIT (OUTPATIENT)
Dept: FAMILY MEDICINE | Age: 41
End: 2021-08-10

## 2021-08-10 ENCOUNTER — LAB REQUISITION (OUTPATIENT)
Dept: LAB | Age: 41
End: 2021-08-10

## 2021-08-10 VITALS
SYSTOLIC BLOOD PRESSURE: 112 MMHG | RESPIRATION RATE: 20 BRPM | HEART RATE: 76 BPM | BODY MASS INDEX: 43.39 KG/M2 | TEMPERATURE: 97.1 F | HEIGHT: 66 IN | DIASTOLIC BLOOD PRESSURE: 78 MMHG | WEIGHT: 270 LBS

## 2021-08-10 DIAGNOSIS — Z00.01 ENCOUNTER FOR GENERAL ADULT MEDICAL EXAMINATION WITH ABNORMAL FINDINGS: ICD-10-CM

## 2021-08-10 DIAGNOSIS — E11.9 TYPE 2 DIABETES MELLITUS WITHOUT COMPLICATION, WITHOUT LONG-TERM CURRENT USE OF INSULIN (CMD): ICD-10-CM

## 2021-08-10 DIAGNOSIS — Z00.01 ENCOUNTER FOR GENERAL ADULT MEDICAL EXAMINATION WITH ABNORMAL FINDINGS: Primary | ICD-10-CM

## 2021-08-10 DIAGNOSIS — Z11.1 SCREENING EXAMINATION FOR PULMONARY TUBERCULOSIS: ICD-10-CM

## 2021-08-10 DIAGNOSIS — Z01.84 IMMUNITY STATUS TESTING: ICD-10-CM

## 2021-08-10 DIAGNOSIS — Z01.84 ENCOUNTER FOR ANTIBODY RESPONSE EXAMINATION: ICD-10-CM

## 2021-08-10 DIAGNOSIS — N92.0 EXCESSIVE AND FREQUENT MENSTRUATION WITH REGULAR CYCLE: ICD-10-CM

## 2021-08-10 DIAGNOSIS — N92.0 EXCESSIVE AND FREQUENT MENSTRUATION: ICD-10-CM

## 2021-08-10 DIAGNOSIS — R30.0 DYSURIA: ICD-10-CM

## 2021-08-10 LAB
ALBUMIN SERPL-MCNC: 4.3 G/DL (ref 3.6–5.1)
ALP SERPL-CCNC: 81 U/L (ref 45–130)
ALT SERPL W/O P-5'-P-CCNC: 76 U/L (ref 4–38)
AST SERPL-CCNC: 82 U/L (ref 14–43)
BACTERIA: ABNORMAL
BASOPHIL %: 0.7 % (ref 0–1.2)
BASOPHIL ABSOLUTE #: 0.1 10*3/UL (ref 0–0.1)
BILIRUB SERPL-MCNC: 0.6 MG/DL (ref 0–1.3)
BILIRUBIN URINE: NEGATIVE
BLOOD URINE: ABNORMAL
BUN SERPL-MCNC: 16 MG/DL (ref 7–20)
CALCIUM SERPL-MCNC: 9.4 MG/DL (ref 8.6–10.6)
CHLORIDE SERPL-SCNC: 98 MMOL/L (ref 96–107)
CHOLEST SERPL-MCNC: 180 MG/DL (ref 140–200)
CLARITY: ABNORMAL
CO2 SERPL-SCNC: 28 MMOL/L (ref 22–32)
COLOR: YELLOW
CREAT SERPL-MCNC: 0.7 MG/DL (ref 0.5–1.4)
DIFFERENTIAL TYPE: ABNORMAL
EOSINOPHIL %: 1.8 % (ref 0–10)
EOSINOPHIL ABSOLUTE #: 0.2 10*3/UL (ref 0–0.5)
EST. AVERAGE GLUCOSE BLD GHB EST-MCNC: 155 MG/DL (ref 0–154)
GFR SERPL CREATININE-BSD FRML MDRD: >60 ML/MIN/{1.73M2}
GFR SERPL CREATININE-BSD FRML MDRD: >60 ML/MIN/{1.73M2}
GLUCOSE P FAST SERPL-MCNC: 170 MG/DL (ref 60–100)
GLUCOSE QUALITATIVE U: NEGATIVE
HBA1C MFR BLD: 7 % (ref 4.2–6)
HDLC SERPL-MCNC: 51 MG/DL
HEMATOCRIT: 46 % (ref 34–45)
HEMOGLOBIN: 14.2 G/DL (ref 11.2–15.7)
IMMATURE GRANULOCYTE ABSOLUTE: 0.04 10*3/UL (ref 0–0.05)
IMMATURE GRANULOCYTE PERCENT: 0.5 % (ref 0–0.5)
KETONES, URINE: NEGATIVE
LDLC SERPL CALC-MCNC: 101 MG/DL (ref 30–100)
LEUKOCYTE ESTERASE URINE: NEGATIVE
LYMPH PERCENT: 22.7 % (ref 20.5–51.1)
LYMPHOCYTE ABSOLUTE #: 2 10*3/UL (ref 1.2–3.4)
MEAN CORPUSCULAR HGB CONCENTRATION: 30.9 % (ref 32–36)
MEAN CORPUSCULAR HGB: 28.7 PG (ref 27–34)
MEAN CORPUSCULAR VOLUME: 93.1 FL (ref 79–95)
MEAN PLATELET VOLUME: 11.5 FL (ref 8.6–12.4)
MONOCYTE ABSOLUTE #: 0.5 10*3/UL (ref 0.2–0.9)
MONOCYTE PERCENT: 5.8 % (ref 4.3–12.9)
MUCOUS: ABNORMAL
NEUTROPHIL ABSOLUTE #: 6 10*3/UL (ref 1.4–6.5)
NEUTROPHIL PERCENT: 68.5 % (ref 34–73.5)
NITRITE URINE: NEGATIVE
PH URINE: 5 (ref 5–7)
PLATELET COUNT: 266 10*3/UL (ref 150–400)
POTASSIUM SERPL-SCNC: 4.5 MMOL/L (ref 3.5–5.3)
PROT SERPL-MCNC: 7.8 G/DL (ref 6.4–8.5)
RED BLOOD CELL COUNT: 4.94 10*6/UL (ref 3.7–5.2)
RED BLOOD CELLS URINE: ABNORMAL (ref 0–3)
RED CELL DISTRIBUTION WIDTH: 13.2 % (ref 11.3–14.8)
SODIUM SERPL-SCNC: 135 MMOL/L (ref 136–146)
SPECIFIC GRAVITY URINE: 1.02 (ref 1–1.03)
SQUAMOUS EPITHELIAL CELLS: ABNORMAL
TRIGL SERPL-MCNC: 139 MG/DL (ref 0–200)
TSH SERPL DL<=0.05 MIU/L-ACNC: 2.15 M[IU]/L (ref 0.3–4.82)
URINE PROTEIN, QUAL (DIPSTICK): NEGATIVE
UROBILINOGEN URINE: <2
WHITE BLOOD CELL COUNT: 8.7 10*3/UL (ref 4–10)
WHITE BLOOD CELLS URINE: ABNORMAL (ref 0–5)

## 2021-08-10 PROCEDURE — 99396 PREV VISIT EST AGE 40-64: CPT | Performed by: PHYSICIAN ASSISTANT

## 2021-08-10 PROCEDURE — 86787 VARICELLA-ZOSTER ANTIBODY: CPT | Performed by: CLINICAL MEDICAL LABORATORY

## 2021-08-10 PROCEDURE — 83036 HEMOGLOBIN GLYCOSYLATED A1C: CPT | Performed by: PHYSICIAN ASSISTANT

## 2021-08-10 PROCEDURE — 80050 GENERAL HEALTH PANEL: CPT | Performed by: PHYSICIAN ASSISTANT

## 2021-08-10 PROCEDURE — 36415 COLL VENOUS BLD VENIPUNCTURE: CPT | Performed by: PHYSICIAN ASSISTANT

## 2021-08-10 PROCEDURE — 3074F SYST BP LT 130 MM HG: CPT | Performed by: PHYSICIAN ASSISTANT

## 2021-08-10 PROCEDURE — 82570 ASSAY OF URINE CREATININE: CPT | Performed by: PHYSICIAN ASSISTANT

## 2021-08-10 PROCEDURE — 81003 URINALYSIS AUTO W/O SCOPE: CPT | Performed by: PHYSICIAN ASSISTANT

## 2021-08-10 PROCEDURE — 80061 LIPID PANEL: CPT | Performed by: PHYSICIAN ASSISTANT

## 2021-08-10 PROCEDURE — 86787 VARICELLA-ZOSTER ANTIBODY: CPT | Performed by: PHYSICIAN ASSISTANT

## 2021-08-10 PROCEDURE — 82043 UR ALBUMIN QUANTITATIVE: CPT | Performed by: PHYSICIAN ASSISTANT

## 2021-08-10 PROCEDURE — 99214 OFFICE O/P EST MOD 30 MIN: CPT | Performed by: PHYSICIAN ASSISTANT

## 2021-08-10 PROCEDURE — 3078F DIAST BP <80 MM HG: CPT | Performed by: PHYSICIAN ASSISTANT

## 2021-08-10 PROCEDURE — PSEU8230 VARICELLA ZOSTER IMMUNITY IGG: Performed by: CLINICAL MEDICAL LABORATORY

## 2021-08-10 PROCEDURE — 86580 TB INTRADERMAL TEST: CPT

## 2021-08-10 RX ORDER — RIBOFLAVIN (VITAMIN B2) 100 MG
100 TABLET ORAL DAILY
COMMUNITY
End: 2021-12-10 | Stop reason: ALTCHOICE

## 2021-08-10 RX ORDER — OLMESARTAN MEDOXOMIL AND HYDROCHLOROTHIAZIDE 20/12.5 20; 12.5 MG/1; MG/1
1 TABLET ORAL DAILY
Qty: 90 TABLET | Refills: 1 | Status: SHIPPED | OUTPATIENT
Start: 2021-08-10 | End: 2022-04-04

## 2021-08-10 RX ORDER — METFORMIN HYDROCHLORIDE 500 MG/1
1000 TABLET, EXTENDED RELEASE ORAL
Qty: 180 TABLET | Refills: 1 | Status: SHIPPED | OUTPATIENT
Start: 2021-08-10 | End: 2022-04-04

## 2021-08-10 RX ORDER — MULTIVIT-MIN/IRON/FOLIC ACID/K 18-600-40
CAPSULE ORAL
COMMUNITY
End: 2021-12-10 | Stop reason: ALTCHOICE

## 2021-08-10 ASSESSMENT — PATIENT HEALTH QUESTIONNAIRE - PHQ9
1. LITTLE INTEREST OR PLEASURE IN DOING THINGS: NOT AT ALL
CLINICAL INTERPRETATION OF PHQ9 SCORE: NO FURTHER SCREENING NEEDED
SUM OF ALL RESPONSES TO PHQ9 QUESTIONS 1 AND 2: 0
CLINICAL INTERPRETATION OF PHQ2 SCORE: NO FURTHER SCREENING NEEDED
SUM OF ALL RESPONSES TO PHQ9 QUESTIONS 1 AND 2: 0
2. FEELING DOWN, DEPRESSED OR HOPELESS: NOT AT ALL

## 2021-08-10 ASSESSMENT — ENCOUNTER SYMPTOMS
HEADACHES: 0
DIARRHEA: 0
DIZZINESS: 0
EYE PAIN: 0
ABDOMINAL DISTENTION: 0
ADENOPATHY: 0
CHILLS: 0
SINUS PRESSURE: 0
POLYDIPSIA: 0
EYE ITCHING: 0
FEVER: 0
COLOR CHANGE: 0
SHORTNESS OF BREATH: 0
WHEEZING: 0
EYE REDNESS: 0
WEAKNESS: 0
ABDOMINAL PAIN: 1
SORE THROAT: 0
AGITATION: 0
SINUS PAIN: 0
CHEST TIGHTNESS: 0
UNEXPECTED WEIGHT CHANGE: 0
DIAPHORESIS: 0
COUGH: 0
FATIGUE: 0
NAUSEA: 0
POLYPHAGIA: 0
FACIAL SWELLING: 0
CONFUSION: 0
RHINORRHEA: 0
ACTIVITY CHANGE: 0
BLOOD IN STOOL: 0
NUMBNESS: 0
CONSTIPATION: 0
LIGHT-HEADEDNESS: 0
NERVOUS/ANXIOUS: 1
FACIAL ASYMMETRY: 0
EYE DISCHARGE: 0
VOMITING: 0
APPETITE CHANGE: 0
BRUISES/BLEEDS EASILY: 0

## 2021-08-11 LAB
ALBUMIN/CREAT UR: NORMAL MG/G (ref 0–30)
CREAT UR-MCNC: 89.3 MG/DL
MICROALBUMIN UR-MCNC: <4.3 MG/L

## 2021-08-12 ENCOUNTER — NURSE ONLY (OUTPATIENT)
Dept: FAMILY MEDICINE | Age: 41
End: 2021-08-12

## 2021-08-12 DIAGNOSIS — Z11.1 SCREENING-PULMONARY TB: Primary | ICD-10-CM

## 2021-08-12 LAB
INDURATION: 0 MM (ref 0–?)
SKIN TEST RESULT: NEGATIVE
VZV IGG SER IA-ACNC: NORMAL
VZV IGG SER IA-ACNC: NORMAL

## 2021-08-13 ENCOUNTER — E-ADVICE (OUTPATIENT)
Dept: FAMILY MEDICINE | Age: 41
End: 2021-08-13

## 2021-11-13 ENCOUNTER — OFFICE VISIT (OUTPATIENT)
Dept: OPTOMETRY | Age: 41
End: 2021-11-13

## 2021-11-13 DIAGNOSIS — E11.9 TYPE 2 DIABETES MELLITUS WITHOUT RETINOPATHY (CMD): Primary | ICD-10-CM

## 2021-11-13 DIAGNOSIS — H43.813 VITREOUS DEGENERATION AND DETACHMENT OF BOTH EYES: ICD-10-CM

## 2021-11-13 PROCEDURE — 92014 COMPRE OPH EXAM EST PT 1/>: CPT | Performed by: OPTOMETRIST

## 2021-11-13 ASSESSMENT — TONOMETRY
IOP_METHOD: TONOPEN
OD_IOP_MMHG: 14
OS_IOP_MMHG: 16

## 2021-11-13 ASSESSMENT — VISUAL ACUITY
OD_SC: 20/20
METHOD: SNELLEN - LINEAR
OD_SC: JAEGER 1+
OS_SC+: -
OS_SC: 20/20
OD_SC+: -

## 2021-11-13 ASSESSMENT — EXTERNAL EXAM - LEFT EYE: OS_EXAM: NORMAL

## 2021-11-13 ASSESSMENT — SLIT LAMP EXAM - LIDS
COMMENTS: NORMAL
COMMENTS: NORMAL

## 2021-11-13 ASSESSMENT — CUP TO DISC RATIO
OD_RATIO: 0.2
OS_RATIO: 0.2

## 2021-11-13 ASSESSMENT — CONF VISUAL FIELD
OS_NORMAL: 1
OD_NORMAL: 1

## 2021-11-13 ASSESSMENT — EXTERNAL EXAM - RIGHT EYE: OD_EXAM: NORMAL

## 2021-11-15 ENCOUNTER — APPOINTMENT (OUTPATIENT)
Dept: ULTRASOUND IMAGING | Age: 41
End: 2021-11-15
Attending: PHYSICIAN ASSISTANT

## 2021-11-15 ENCOUNTER — IMAGING SERVICES (OUTPATIENT)
Dept: MAMMOGRAPHY | Age: 41
End: 2021-11-15
Attending: PHYSICIAN ASSISTANT

## 2021-11-15 DIAGNOSIS — N64.4 BREAST PAIN: ICD-10-CM

## 2021-11-15 PROCEDURE — 77063 BREAST TOMOSYNTHESIS BI: CPT | Performed by: RADIOLOGY

## 2021-11-15 PROCEDURE — 77067 SCR MAMMO BI INCL CAD: CPT | Performed by: RADIOLOGY

## 2021-11-22 DIAGNOSIS — R92.8 ABNORMAL MAMMOGRAM: Primary | ICD-10-CM

## 2021-12-10 ENCOUNTER — OFFICE VISIT (OUTPATIENT)
Dept: FAMILY MEDICINE | Age: 41
End: 2021-12-10

## 2021-12-10 VITALS
DIASTOLIC BLOOD PRESSURE: 84 MMHG | TEMPERATURE: 97.9 F | WEIGHT: 254 LBS | HEIGHT: 65 IN | OXYGEN SATURATION: 97 % | BODY MASS INDEX: 42.32 KG/M2 | SYSTOLIC BLOOD PRESSURE: 120 MMHG | HEART RATE: 70 BPM

## 2021-12-10 DIAGNOSIS — J06.9 VIRAL UPPER RESPIRATORY TRACT INFECTION: Primary | ICD-10-CM

## 2021-12-10 PROCEDURE — 3079F DIAST BP 80-89 MM HG: CPT | Performed by: PHYSICIAN ASSISTANT

## 2021-12-10 PROCEDURE — 99213 OFFICE O/P EST LOW 20 MIN: CPT | Performed by: PHYSICIAN ASSISTANT

## 2021-12-10 PROCEDURE — 3074F SYST BP LT 130 MM HG: CPT | Performed by: PHYSICIAN ASSISTANT

## 2021-12-10 ASSESSMENT — ENCOUNTER SYMPTOMS
SINUS PRESSURE: 1
COUGH: 1
CONSTITUTIONAL NEGATIVE: 1
HEADACHES: 1

## 2021-12-10 ASSESSMENT — PATIENT HEALTH QUESTIONNAIRE - PHQ9
CLINICAL INTERPRETATION OF PHQ2 SCORE: NO FURTHER SCREENING NEEDED
SUM OF ALL RESPONSES TO PHQ9 QUESTIONS 1 AND 2: 0
2. FEELING DOWN, DEPRESSED OR HOPELESS: NOT AT ALL
SUM OF ALL RESPONSES TO PHQ9 QUESTIONS 1 AND 2: 0
1. LITTLE INTEREST OR PLEASURE IN DOING THINGS: NOT AT ALL

## 2021-12-11 ENCOUNTER — APPOINTMENT (OUTPATIENT)
Dept: OBGYN | Age: 41
End: 2021-12-11
Attending: PHYSICIAN ASSISTANT

## 2021-12-14 ENCOUNTER — TELEPHONE (OUTPATIENT)
Dept: SURGERY | Age: 41
End: 2021-12-14

## 2021-12-20 ENCOUNTER — APPOINTMENT (OUTPATIENT)
Dept: SURGERY | Age: 41
End: 2021-12-20
Attending: PHYSICIAN ASSISTANT

## 2022-02-02 ENCOUNTER — TELEPHONE (OUTPATIENT)
Dept: SURGERY | Age: 42
End: 2022-02-02

## 2022-04-04 RX ORDER — OLMESARTAN MEDOXOMIL AND HYDROCHLOROTHIAZIDE 20/12.5 20; 12.5 MG/1; MG/1
TABLET ORAL
Qty: 90 TABLET | Refills: 0 | Status: SHIPPED | OUTPATIENT
Start: 2022-04-04 | End: 2022-07-19 | Stop reason: SDUPTHER

## 2022-04-04 RX ORDER — METFORMIN HYDROCHLORIDE 500 MG/1
TABLET, EXTENDED RELEASE ORAL
Qty: 180 TABLET | Refills: 0 | Status: SHIPPED | OUTPATIENT
Start: 2022-04-04 | End: 2022-07-19 | Stop reason: SDUPTHER

## 2022-05-12 ENCOUNTER — TELEPHONE (OUTPATIENT)
Dept: FAMILY MEDICINE | Age: 42
End: 2022-05-12

## 2022-05-18 ENCOUNTER — TELEPHONE (OUTPATIENT)
Dept: FAMILY MEDICINE | Age: 42
End: 2022-05-18

## 2022-05-19 ENCOUNTER — TELEPHONE (OUTPATIENT)
Dept: FAMILY MEDICINE | Age: 42
End: 2022-05-19

## 2022-06-09 ENCOUNTER — TELEPHONE (OUTPATIENT)
Dept: FAMILY MEDICINE | Age: 42
End: 2022-06-09

## 2022-06-14 ENCOUNTER — TELEPHONE (OUTPATIENT)
Dept: FAMILY MEDICINE | Age: 42
End: 2022-06-14

## 2022-07-16 RX ORDER — PERPHENAZINE 16 MG/1
TABLET, FILM COATED ORAL
Qty: 50 EACH | Refills: 0 | Status: SHIPPED | OUTPATIENT
Start: 2022-07-16 | End: 2022-07-19 | Stop reason: SDUPTHER

## 2022-07-19 ENCOUNTER — TELEPHONE (OUTPATIENT)
Dept: FAMILY MEDICINE | Age: 42
End: 2022-07-19

## 2022-07-19 RX ORDER — OLMESARTAN MEDOXOMIL AND HYDROCHLOROTHIAZIDE 20/12.5 20; 12.5 MG/1; MG/1
1 TABLET ORAL DAILY
Qty: 90 TABLET | Refills: 0 | Status: SHIPPED | OUTPATIENT
Start: 2022-07-19

## 2022-07-19 RX ORDER — METFORMIN HYDROCHLORIDE 500 MG/1
TABLET, EXTENDED RELEASE ORAL
Qty: 180 TABLET | Refills: 0 | Status: SHIPPED | OUTPATIENT
Start: 2022-07-19

## 2022-07-20 ENCOUNTER — TELEPHONE (OUTPATIENT)
Dept: FAMILY MEDICINE | Age: 42
End: 2022-07-20

## 2022-07-23 RX ORDER — BLOOD-GLUCOSE METER
EACH MISCELLANEOUS
Qty: 1 KIT | Refills: 1 | Status: SHIPPED | OUTPATIENT
Start: 2022-07-23

## 2022-08-20 ENCOUNTER — TELEPHONE (OUTPATIENT)
Dept: FAMILY MEDICINE CLINIC | Facility: CLINIC | Age: 42
End: 2022-08-20

## 2022-08-20 ENCOUNTER — OFFICE VISIT (OUTPATIENT)
Dept: FAMILY MEDICINE CLINIC | Facility: CLINIC | Age: 42
End: 2022-08-20
Payer: MEDICAID

## 2022-08-20 VITALS
DIASTOLIC BLOOD PRESSURE: 70 MMHG | BODY MASS INDEX: 42 KG/M2 | OXYGEN SATURATION: 98 % | TEMPERATURE: 98 F | SYSTOLIC BLOOD PRESSURE: 124 MMHG | HEART RATE: 88 BPM | RESPIRATION RATE: 16 BRPM | WEIGHT: 251 LBS

## 2022-08-20 DIAGNOSIS — M25.562 CHRONIC PAIN OF LEFT KNEE: ICD-10-CM
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
## 2022-08-20 DIAGNOSIS — Z12.31 ENCOUNTER FOR SCREENING MAMMOGRAM FOR MALIGNANT NEOPLASM OF BREAST: ICD-10-CM

## 2022-08-20 DIAGNOSIS — E11.9 TYPE 2 DIABETES MELLITUS WITHOUT COMPLICATION, WITHOUT LONG-TERM CURRENT USE OF INSULIN (HCC): Primary | ICD-10-CM

## 2022-08-20 DIAGNOSIS — G89.29 CHRONIC PAIN OF LEFT KNEE: ICD-10-CM

## 2022-08-20 DIAGNOSIS — I10 BENIGN ESSENTIAL HYPERTENSION: ICD-10-CM

## 2022-08-20 DIAGNOSIS — E66.01 CLASS 3 SEVERE OBESITY DUE TO EXCESS CALORIES WITH SERIOUS COMORBIDITY AND BODY MASS INDEX (BMI) OF 40.0 TO 44.9 IN ADULT (HCC): ICD-10-CM

## 2022-08-20 LAB
CARTRIDGE LOT#: 978 NUMERIC
CREAT UR-SCNC: 74 MG/DL
HEMOGLOBIN A1C: 6.3 % (ref 4.3–5.6)
MICROALBUMIN UR-MCNC: <0.5 MG/DL

## 2022-08-20 PROCEDURE — 82570 ASSAY OF URINE CREATININE: CPT | Performed by: FAMILY MEDICINE

## 2022-08-20 PROCEDURE — 82043 UR ALBUMIN QUANTITATIVE: CPT | Performed by: FAMILY MEDICINE

## 2022-08-20 RX ORDER — METFORMIN HYDROCHLORIDE 500 MG/1
1000 TABLET, EXTENDED RELEASE ORAL
COMMUNITY
Start: 2022-07-20 | End: 2022-08-20

## 2022-08-20 RX ORDER — BLOOD-GLUCOSE METER
EACH MISCELLANEOUS
COMMUNITY
Start: 2022-08-12

## 2022-08-20 RX ORDER — BLOOD SUGAR DIAGNOSTIC
STRIP MISCELLANEOUS
Qty: 100 EACH | Refills: 0 | Status: SHIPPED | OUTPATIENT
Start: 2022-08-20

## 2022-08-20 RX ORDER — OLMESARTAN MEDOXOMIL AND HYDROCHLOROTHIAZIDE 20/12.5 20; 12.5 MG/1; MG/1
1 TABLET ORAL DAILY
COMMUNITY
Start: 2022-07-19

## 2022-08-20 RX ORDER — BLOOD-GLUCOSE METER
1 EACH MISCELLANEOUS 2 TIMES DAILY
Qty: 1 KIT | Refills: 0 | COMMUNITY
Start: 2022-08-20 | End: 2023-08-20

## 2022-08-20 RX ORDER — GLIPIZIDE 10 MG/1
10 TABLET, FILM COATED, EXTENDED RELEASE ORAL DAILY
Qty: 90 TABLET | Refills: 0 | Status: SHIPPED | OUTPATIENT
Start: 2022-08-20 | End: 2022-11-18

## 2022-08-20 RX ORDER — METFORMIN HYDROCHLORIDE 500 MG/1
500 TABLET, EXTENDED RELEASE ORAL
Qty: 90 TABLET | Refills: 0 | Status: SHIPPED | OUTPATIENT
Start: 2022-08-20 | End: 2022-11-18

## 2022-08-20 NOTE — TELEPHONE ENCOUNTER
Medical record request for November 2021 eye exam report from Jhon Kiser. Faxed medical record request form to [phone]. Awaiting on report.

## 2022-08-27 ENCOUNTER — HOSPITAL ENCOUNTER (OUTPATIENT)
Dept: GENERAL RADIOLOGY | Age: 42
Discharge: HOME | End: 2022-08-27
Attending: FAMILY MEDICINE
Payer: MEDICAID

## 2022-08-27 ENCOUNTER — LAB ENCOUNTER (OUTPATIENT)
Dept: LAB | Age: 42
End: 2022-08-27
Attending: FAMILY MEDICINE
Payer: MEDICAID

## 2022-08-27 DIAGNOSIS — E11.9 TYPE 2 DIABETES MELLITUS WITHOUT COMPLICATION, WITHOUT LONG-TERM CURRENT USE OF INSULIN (HCC): ICD-10-CM

## 2022-08-27 DIAGNOSIS — Z01.89 ENCOUNTER FOR LOWER EXTREMITY COMPARISON IMAGING STUDY: ICD-10-CM

## 2022-08-27 DIAGNOSIS — M25.562 CHRONIC PAIN OF LEFT KNEE: ICD-10-CM

## 2022-08-27 DIAGNOSIS — G89.29 CHRONIC PAIN OF LEFT KNEE: ICD-10-CM

## 2022-08-27 LAB
ALBUMIN SERPL-MCNC: 3.4 G/DL (ref 3.4–5)
ALBUMIN/GLOB SERPL: 0.8 {RATIO} (ref 1–2)
ALP LIVER SERPL-CCNC: 59 U/L
ALT SERPL-CCNC: 32 U/L
ANION GAP SERPL CALC-SCNC: 2 MMOL/L (ref 0–18)
AST SERPL-CCNC: 14 U/L (ref 15–37)
BASOPHILS # BLD AUTO: 0.04 X10(3) UL (ref 0–0.2)
BASOPHILS NFR BLD AUTO: 0.6 %
BILIRUB SERPL-MCNC: 0.3 MG/DL (ref 0.1–2)
BUN BLD-MCNC: 11 MG/DL (ref 7–18)
CALCIUM BLD-MCNC: 8.9 MG/DL (ref 8.5–10.1)
CHLORIDE SERPL-SCNC: 111 MMOL/L (ref 98–112)
CHOLEST SERPL-MCNC: 131 MG/DL (ref ?–200)
CO2 SERPL-SCNC: 28 MMOL/L (ref 21–32)
CREAT BLD-MCNC: 0.73 MG/DL
EOSINOPHIL # BLD AUTO: 0.16 X10(3) UL (ref 0–0.7)
EOSINOPHIL NFR BLD AUTO: 2.3 %
ERYTHROCYTE [DISTWIDTH] IN BLOOD BY AUTOMATED COUNT: 12.7 %
FASTING PATIENT LIPID ANSWER: YES
FASTING STATUS PATIENT QL REPORTED: YES
GFR SERPLBLD BASED ON 1.73 SQ M-ARVRAT: 106 ML/MIN/1.73M2 (ref 60–?)
GLOBULIN PLAS-MCNC: 4.1 G/DL (ref 2.8–4.4)
GLUCOSE BLD-MCNC: 95 MG/DL (ref 70–99)
HCT VFR BLD AUTO: 44.4 %
HDLC SERPL-MCNC: 45 MG/DL (ref 40–59)
HGB BLD-MCNC: 13.9 G/DL
IMM GRANULOCYTES # BLD AUTO: 0.02 X10(3) UL (ref 0–1)
IMM GRANULOCYTES NFR BLD: 0.3 %
LDLC SERPL CALC-MCNC: 73 MG/DL (ref ?–100)
LYMPHOCYTES # BLD AUTO: 2.38 X10(3) UL (ref 1–4)
LYMPHOCYTES NFR BLD AUTO: 34.1 %
MCH RBC QN AUTO: 29.9 PG (ref 26–34)
MCHC RBC AUTO-ENTMCNC: 31.3 G/DL (ref 31–37)
MCV RBC AUTO: 95.5 FL
MONOCYTES # BLD AUTO: 0.44 X10(3) UL (ref 0.1–1)
MONOCYTES NFR BLD AUTO: 6.3 %
NEUTROPHILS # BLD AUTO: 3.94 X10 (3) UL (ref 1.5–7.7)
NEUTROPHILS # BLD AUTO: 3.94 X10(3) UL (ref 1.5–7.7)
NEUTROPHILS NFR BLD AUTO: 56.4 %
NONHDLC SERPL-MCNC: 86 MG/DL (ref ?–130)
OSMOLALITY SERPL CALC.SUM OF ELEC: 291 MOSM/KG (ref 275–295)
PLATELET # BLD AUTO: 244 10(3)UL (ref 150–450)
POTASSIUM SERPL-SCNC: 4.8 MMOL/L (ref 3.5–5.1)
PROT SERPL-MCNC: 7.5 G/DL (ref 6.4–8.2)
RBC # BLD AUTO: 4.65 X10(6)UL
SODIUM SERPL-SCNC: 141 MMOL/L (ref 136–145)
T4 FREE SERPL-MCNC: 1.2 NG/DL (ref 0.8–1.7)
TRIGL SERPL-MCNC: 64 MG/DL (ref 30–149)
TSI SER-ACNC: 1.51 MIU/ML (ref 0.36–3.74)
VLDLC SERPL CALC-MCNC: 10 MG/DL (ref 0–30)
WBC # BLD AUTO: 7 X10(3) UL (ref 4–11)

## 2022-08-27 PROCEDURE — 84439 ASSAY OF FREE THYROXINE: CPT

## 2022-08-27 PROCEDURE — 85025 COMPLETE CBC W/AUTO DIFF WBC: CPT

## 2022-08-27 PROCEDURE — 73562 X-RAY EXAM OF KNEE 3: CPT | Performed by: FAMILY MEDICINE

## 2022-08-27 PROCEDURE — 84443 ASSAY THYROID STIM HORMONE: CPT

## 2022-08-27 PROCEDURE — 36415 COLL VENOUS BLD VENIPUNCTURE: CPT

## 2022-08-27 PROCEDURE — 80061 LIPID PANEL: CPT

## 2022-08-27 PROCEDURE — 80053 COMPREHEN METABOLIC PANEL: CPT

## 2022-08-27 PROCEDURE — 73564 X-RAY EXAM KNEE 4 OR MORE: CPT | Performed by: FAMILY MEDICINE

## 2022-09-01 ENCOUNTER — TELEPHONE (OUTPATIENT)
Dept: FAMILY MEDICINE CLINIC | Facility: CLINIC | Age: 42
End: 2022-09-01

## 2022-09-01 NOTE — TELEPHONE ENCOUNTER
Received diabetic eye exam report from [facility] . Pt completed eye exam on 11/13/21. No diabetic retinopathy. Flow sheet updated in patients chart. Report sent to scan.

## 2022-09-23 ENCOUNTER — TELEPHONE (OUTPATIENT)
Dept: FAMILY MEDICINE | Age: 42
End: 2022-09-23

## 2022-09-26 DIAGNOSIS — E11.9 TYPE 2 DIABETES MELLITUS WITHOUT COMPLICATION, WITHOUT LONG-TERM CURRENT USE OF INSULIN (CMD): Primary | ICD-10-CM

## 2022-09-28 ENCOUNTER — PATIENT MESSAGE (OUTPATIENT)
Dept: FAMILY MEDICINE CLINIC | Facility: CLINIC | Age: 42
End: 2022-09-28

## 2022-09-29 ENCOUNTER — TELEPHONE (OUTPATIENT)
Dept: FAMILY MEDICINE | Age: 42
End: 2022-09-29

## 2022-09-29 RX ORDER — OLMESARTAN MEDOXOMIL 20 MG/1
20 TABLET ORAL DAILY
Qty: 90 TABLET | Refills: 0 | Status: SHIPPED | OUTPATIENT
Start: 2022-09-29

## 2022-09-29 RX ORDER — HYDROCHLOROTHIAZIDE 12.5 MG/1
12.5 TABLET ORAL DAILY
Qty: 90 TABLET | Refills: 0 | Status: SHIPPED | OUTPATIENT
Start: 2022-09-29

## 2022-10-02 ENCOUNTER — PATIENT MESSAGE (OUTPATIENT)
Dept: FAMILY MEDICINE CLINIC | Facility: CLINIC | Age: 42
End: 2022-10-02

## 2022-10-03 ENCOUNTER — TELEPHONE (OUTPATIENT)
Dept: FAMILY MEDICINE CLINIC | Facility: CLINIC | Age: 42
End: 2022-10-03

## 2022-10-03 ENCOUNTER — TELEPHONE (OUTPATIENT)
Dept: ORTHOPEDICS CLINIC | Facility: CLINIC | Age: 42
End: 2022-10-03

## 2022-10-03 NOTE — TELEPHONE ENCOUNTER
See patient message from 10/2/22.  Patient was advised to use [employer] coupon

## 2022-10-03 NOTE — TELEPHONE ENCOUNTER
Patient is scheduled with Dr. Gilford Ables for a knot on her left knee with pain. Please advise if imaging is needed.

## 2022-10-03 NOTE — TELEPHONE ENCOUNTER
Pt states that her insurance will not cover the Olmesartan. They did cover the Hydrochlorothiazide but not the Olmesartan. Pt would like to know if there is another compatible medication that can be sent to the local [address] [place] [address].

## 2022-10-03 NOTE — TELEPHONE ENCOUNTER
Patient calling on status on refill, patient states she has been out of medication for 6 days. Insurance not covering medication, might need alternative. Last BP taken was 150/105, states readings have been elevated.  Please advise

## 2022-11-07 ENCOUNTER — OFFICE VISIT (OUTPATIENT)
Dept: INTERNAL MEDICINE CLINIC | Facility: CLINIC | Age: 42
End: 2022-11-07
Payer: MEDICAID

## 2022-11-07 VITALS
SYSTOLIC BLOOD PRESSURE: 122 MMHG | HEIGHT: 66 IN | DIASTOLIC BLOOD PRESSURE: 80 MMHG | WEIGHT: 254 LBS | BODY MASS INDEX: 40.82 KG/M2 | RESPIRATION RATE: 16 BRPM

## 2022-11-07 DIAGNOSIS — E66.01 OBESITY, MORBID, BMI 40.0-49.9 (HCC): ICD-10-CM

## 2022-11-07 DIAGNOSIS — E11.9 TYPE 2 DIABETES MELLITUS WITHOUT COMPLICATION, WITHOUT LONG-TERM CURRENT USE OF INSULIN (HCC): ICD-10-CM

## 2022-11-07 DIAGNOSIS — Z51.81 ENCOUNTER FOR THERAPEUTIC DRUG MONITORING: Primary | ICD-10-CM

## 2022-11-07 DIAGNOSIS — I10 HYPERTENSION, UNSPECIFIED TYPE: ICD-10-CM

## 2022-11-07 PROCEDURE — 99204 OFFICE O/P NEW MOD 45 MIN: CPT | Performed by: NURSE PRACTITIONER

## 2022-11-07 PROCEDURE — 3074F SYST BP LT 130 MM HG: CPT | Performed by: NURSE PRACTITIONER

## 2022-11-07 PROCEDURE — 3008F BODY MASS INDEX DOCD: CPT | Performed by: NURSE PRACTITIONER

## 2022-11-07 PROCEDURE — 3079F DIAST BP 80-89 MM HG: CPT | Performed by: NURSE PRACTITIONER

## 2022-11-07 RX ORDER — DULAGLUTIDE 0.75 MG/.5ML
0.75 INJECTION, SOLUTION SUBCUTANEOUS WEEKLY
Qty: 4 PEN | Refills: 1 | Status: SHIPPED
Start: 2022-11-07 | End: 2022-12-05

## 2022-11-07 NOTE — PATIENT INSTRUCTIONS
Welcome to the [facility]!!  Thank you for placing your trust in our health care team, I look forward to working with you along this journey to better health! Next steps:     1.  Schedule a personal nutrition consultation with one of our registered dieticians. Bring along your food journal (3 days minimum). See journal options below. 2.  Fill your prescribed medication and take as discussed and prescribed: Trulicity 1.81WW weekly X4 weeks and then increase to 1.5mg weekly X4 weeks   3. Can check out [facility] or [facility] for surgery      Please try to work on the following dietary changes this first month:  Daily protein recommendation to start: 100 grams  Daily carbohydrate: < 130g  Daily calories: 1,700  1. Drink water with meals and throughout the day, cut down on soda and/or juice if consumed. Consider flavored water options like Bubbly, Spindrift, Hint and Leia. 2.  Eat breakfast daily and focus on having protein with each meal, examples include: greek yogurt, cottage cheese, hard boiled egg, whole grain toast with peanut butter. 3.  Reduce refined carbohydrates and sugars which includes items such as sweets, as well as rice, pasta, and bread and make sure to choose whole grain options when having them with just 1 serving per meal about the size of your inner palm. 4.  Consume non starchy veggies daily working towards making them a good 50% of your daily food intake. Add them to lunch and dinner consistently. 5.  Start a daily probiotic: VSL#3 is recommended, (order on line at www.vsl3. com). Take 1 capsule daily with water for 30 days, then reduce to 1 every other day (this will reduce the cost). Capsules can be left out for 2 weeks, but then must be refrigerated. Please download luis enrique My Fitness Cindy Hatch!  Or Net Diary to monitor daily dietary intake and you will be able to see if you are eating the right amount of calories or too much or too little which would hinder weight loss. Additionally this will help to see your daily carbohydrate and protein intake. When you set the kassi up choose 1-2 lbs/week as a goal.  Keeping a paper food journal is an option as well to remain accountable for your choices- this is the start to mindful eating! A low calorie diet has been consistently shown to support weight loss. Continue or start exercising to help establish a routine. If not already exercising begin with 1 day and progress as able with long-term goal of 30 minutes 5 days a week at a minimum. Meditation daily can help manage and control stress. Chronic stress can make weight loss difficult. Exercising is one way to help with stress, but meditation using the CALM Kassi or another comparable alternative can be done in your home or place of work with little time commitment. This Kassi can also help work on behavior change and improve sleep. Check out the segment under Calm Masterclass and listen to The 4 Pillars of Health. A great way to begin learning about the foundation of lifestyle with practical tips to use in your every day. Check out www.yourweightmatters. org blog for continued daily support and education along this weight loss journey! Patient Resources:     Personal Training/Fitness Classes/Health Coaching     Traci Krueger and Almaraz Sophiaside @ [email or web address]/ Full fitness center with group fitness and personal training. Discount [facility]. Health Coaching and Personal Training with Xena Quevedo at our [facility]- individual weekly coaching with option to add personal training and small group fitness classes targeted at weight loss- [phone] and/or email @ Marie [email or web address]  360FIT Jody [email or web address] Group Fitness [phone] and/or email Jhon Benavides at [email or web address]  OptiNose with multiple locations: Aetna (www.Verteego (Emerald Vision)), Eat The Frog Fitness (www.eatArchetype Media. Pressglue), Fit Body Bootcamp (www.BookFreshbodyboAdmazelyp.Pressglue), Pokelabo (www.Toushay - It's what's in store. Pressglue), The Exercise  (www.exercisecoach.Pressglue)     Online Fitness  Fitness  on Whole Foods in 10 DVD series- www. jyxji63EAD. Pressglue  Sit and Be Fit - Chair exercise series Www.sitandbefit. org  Hip Hop Fit with Quan Marquez at www.hiphopfit. net     Apps for on the Exara 7 Minute Workout (orange box with white 7) - free on the go HIIT training kassi  Peloton Kassi @ [employer]     Nutrition Trackers and Tools  LoseIT! And My Fitness Pal apps and on line for tracking nutrition  NOOM - virtual health coaching  FitFoundation (healthy meals on the go) in [facility]-SCI @ Primc[employer]ent [employer] xkwniqgjlyngq0c. Vinh Crepso MD @ www.CFO.com and Branden Miller (keto and low carb plans recommended) @ www. HORAUY85.YVX, Metabolic Meals @ www. MyMetabolicMeals. com - individual prepared meals to go  SOLOMO365, Shopventory, International Business Machines, Every Plate, Beyond Encryption Technologies- on line meal delivery programs for preparation at home  AK CellTran in [place] for homemade meals to go @ wwwOverflow CafemealFactory Logic. Pressglue  Diet Doctor @ www. dietdoctor. com - low carb swaps  Reonomy - meal prep and planning kassi (www.yummly. com)     Stress Management/Behavior/Mindful Eating  CALM meditation kassi (www.calm. Pressglue)  Headspace  Am I Hungry? Mindful eating virtual  kassi  Www.yourweightmatters. org - Obesity Action Coalition sponsored Blog posts daily  Motivation kassi (black box with white \")- daily supportive messages sent to your phone     Books/Video Education/Podcasts  Mindless Eating by Johana Gonzalez  Why We Get Sick by Jamil Cespedes (a book about insulin resistance)  Atomic Habits by Noemy Calix (a book about taking small steps to promote greater behavior change)   Can't Hurt Me by Olivia Chao (a book exploring the power of discipline in achieving your goals)  The End of Dieting:  How to Live for Life by Dr. Shant Mcgovern M.D. or listen to The [facility] Episode 61: Understanding \"Nutritarian\" Eating w/Dr. Shant Mcgovern  Your Body in Balance: The World Fuel Services Corporation of Food, Hormones, and Health by Dr. Melody Velez  The Menopause Diet Plan by India Greer [facility] HOSP AT [facility]  The Complete Guide to fasting by Dr. Mike Brush, [address] [facility] by Derick Her, Ph.D, R.D. Weight Loss Surgery Will Not Treat Food Addiction by Liz Delcid Ph.D  The [address] [place] [address] on plant based nutrition  Fed Up - documentary about obesity (Free on Global Rockstarn)  The Truth About Sugar - documentary on sugar (Free on Accupost Corporation, https://youtu. be/6Y1feewKJ9d)  The Dr. Martha Abdul by Dr. Alis Le MD  Fitlosophy Fitspiration - journal to better health (found at [employer] in fitness aisle)  What Happened to You?- a look at the impact trauma has on behavior written by Salvador Borrego and Dr. Stacey Carnes Again by Maria E Echavarria - discovering your true self after trauma  Emily Mccall talk on [employer] Ltd., The Call to Courage  Podcasts: The Exam Room by the Physician's Committee, Nutrition Facts by Dr. Johnny Ignacio    We are here to support you with weight loss, but please remember that you still need your primary care provider for your routine health maintenance.

## 2022-11-14 ENCOUNTER — TELEPHONE (OUTPATIENT)
Dept: FAMILY MEDICINE CLINIC | Facility: CLINIC | Age: 42
End: 2022-11-14

## 2022-11-14 NOTE — TELEPHONE ENCOUNTER
Future Appointments   Date Time Provider John Wang   11/18/2022 11:20 AM PF [facility] RM2 PF [address] [place]   12/12/2022  2:30 PM Toni Madrigal,  EM[facility] [address]     Patient scheduled appointment via MyChart for-heart palpitations.  Please advise

## 2022-11-18 ENCOUNTER — HOSPITAL ENCOUNTER (OUTPATIENT)
Dept: MAMMOGRAPHY | Age: 42
Discharge: HOME | End: 2022-11-18
Attending: FAMILY MEDICINE
Payer: MEDICAID

## 2022-11-18 DIAGNOSIS — Z12.31 ENCOUNTER FOR SCREENING MAMMOGRAM FOR MALIGNANT NEOPLASM OF BREAST: ICD-10-CM

## 2022-11-18 PROCEDURE — 77067 SCR MAMMO BI INCL CAD: CPT | Performed by: FAMILY MEDICINE

## 2022-11-18 PROCEDURE — 77063 BREAST TOMOSYNTHESIS BI: CPT | Performed by: FAMILY MEDICINE

## 2022-12-12 ENCOUNTER — OFFICE VISIT (OUTPATIENT)
Dept: FAMILY MEDICINE CLINIC | Facility: CLINIC | Age: 42
End: 2022-12-12
Payer: MEDICAID

## 2022-12-12 VITALS
WEIGHT: 242 LBS | TEMPERATURE: 97 F | HEIGHT: 66 IN | HEART RATE: 72 BPM | DIASTOLIC BLOOD PRESSURE: 88 MMHG | BODY MASS INDEX: 38.89 KG/M2 | SYSTOLIC BLOOD PRESSURE: 108 MMHG

## 2022-12-12 DIAGNOSIS — R00.2 PALPITATIONS: ICD-10-CM

## 2022-12-12 DIAGNOSIS — Z23 NEED FOR VACCINATION: ICD-10-CM

## 2022-12-12 DIAGNOSIS — E11.9 TYPE 2 DIABETES MELLITUS WITHOUT COMPLICATION, WITHOUT LONG-TERM CURRENT USE OF INSULIN (HCC): Primary | ICD-10-CM

## 2022-12-12 DIAGNOSIS — Z23 FLU VACCINE NEED: ICD-10-CM

## 2022-12-12 DIAGNOSIS — I10 PRIMARY HYPERTENSION: ICD-10-CM

## 2022-12-12 DIAGNOSIS — Z91.89 AT HIGH RISK FOR PNEUMONIA: ICD-10-CM

## 2022-12-12 LAB
ATRIAL RATE: 72 BPM
P AXIS: 19 DEGREES
P-R INTERVAL: 166 MS
Q-T INTERVAL: 394 MS
QRS DURATION: 80 MS
QTC CALCULATION (BEZET): 431 MS
R AXIS: -3 DEGREES
T AXIS: 14 DEGREES
VENTRICULAR RATE: 72 BPM

## 2022-12-12 RX ORDER — DULAGLUTIDE 0.75 MG/.5ML
INJECTION, SOLUTION SUBCUTANEOUS
COMMUNITY
Start: 2022-12-10

## 2022-12-12 RX ORDER — OLMESARTAN MEDOXOMIL 20 MG/1
20 TABLET ORAL DAILY
Qty: 90 TABLET | Refills: 0 | Status: SHIPPED | OUTPATIENT
Start: 2022-12-12

## 2022-12-12 NOTE — TELEPHONE ENCOUNTER
Future Appointments   Date Time Provider John Wang   12/12/2022  2:30 PM Yonas Mccullough,  EMG [address]

## 2022-12-19 ENCOUNTER — TELEPHONE (OUTPATIENT)
Dept: FAMILY MEDICINE CLINIC | Facility: CLINIC | Age: 42
End: 2022-12-19

## 2022-12-19 NOTE — TELEPHONE ENCOUNTER
Re-submitted PA for Olmesartan 20 mg, response to payers question not submitted in deadline time. Await response.

## 2022-12-20 DIAGNOSIS — R00.2 PALPITATIONS: ICD-10-CM

## 2022-12-20 DIAGNOSIS — I10 PRIMARY HYPERTENSION: ICD-10-CM

## 2022-12-20 RX ORDER — OLMESARTAN MEDOXOMIL 20 MG/1
TABLET ORAL
Qty: 90 TABLET | Refills: 0 | OUTPATIENT
Start: 2022-12-20

## 2022-12-21 ENCOUNTER — PATIENT MESSAGE (OUTPATIENT)
Dept: FAMILY MEDICINE CLINIC | Facility: CLINIC | Age: 42
End: 2022-12-21

## 2022-12-21 DIAGNOSIS — R00.2 PALPITATIONS: ICD-10-CM

## 2022-12-21 DIAGNOSIS — I10 PRIMARY HYPERTENSION: ICD-10-CM

## 2022-12-21 RX ORDER — OLMESARTAN MEDOXOMIL 20 MG/1
20 TABLET ORAL DAILY
Qty: 90 TABLET | Refills: 0 | Status: SHIPPED | OUTPATIENT
Start: 2022-12-21

## 2022-12-21 NOTE — TELEPHONE ENCOUNTER
From: Jearlean Georgis ToGriselda Leaven  Sent: 12/20/2022 12:30 PM CST  Subject: Medication    Hi Madelen,    We submitted a prior-authorization for the Olmesartan medication and unfortunately it was denied. You can use a goodrx coupon to get this medication at a cheaper price. Let us know if you have any questions or concerns.   Have a good rest of your day!    -SUZETTE Olmos

## 2023-01-02 ENCOUNTER — LAB ENCOUNTER (OUTPATIENT)
Dept: LAB | Age: 43
End: 2023-01-02
Attending: FAMILY MEDICINE
Payer: MEDICAID

## 2023-01-02 DIAGNOSIS — E11.9 TYPE 2 DIABETES MELLITUS WITHOUT COMPLICATION, WITHOUT LONG-TERM CURRENT USE OF INSULIN (HCC): ICD-10-CM

## 2023-01-02 PROCEDURE — 82043 UR ALBUMIN QUANTITATIVE: CPT

## 2023-01-02 PROCEDURE — 36415 COLL VENOUS BLD VENIPUNCTURE: CPT

## 2023-01-02 PROCEDURE — 82570 ASSAY OF URINE CREATININE: CPT

## 2023-01-02 PROCEDURE — 3061F NEG MICROALBUMINURIA REV: CPT | Performed by: FAMILY MEDICINE

## 2023-01-02 PROCEDURE — 80061 LIPID PANEL: CPT

## 2023-01-02 PROCEDURE — 3044F HG A1C LEVEL LT 7.0%: CPT | Performed by: FAMILY MEDICINE

## 2023-01-02 PROCEDURE — 80053 COMPREHEN METABOLIC PANEL: CPT

## 2023-01-02 PROCEDURE — 83036 HEMOGLOBIN GLYCOSYLATED A1C: CPT

## 2023-01-03 LAB
ALBUMIN SERPL-MCNC: 3.5 G/DL (ref 3.4–5)
ALBUMIN/GLOB SERPL: 0.9 {RATIO} (ref 1–2)
ALP LIVER SERPL-CCNC: 56 U/L
ALT SERPL-CCNC: 23 U/L
ANION GAP SERPL CALC-SCNC: 7 MMOL/L (ref 0–18)
AST SERPL-CCNC: 14 U/L (ref 15–37)
BILIRUB SERPL-MCNC: 0.4 MG/DL (ref 0.1–2)
BUN BLD-MCNC: 10 MG/DL (ref 7–18)
CALCIUM BLD-MCNC: 8.7 MG/DL (ref 8.5–10.1)
CHLORIDE SERPL-SCNC: 109 MMOL/L (ref 98–112)
CHOLEST SERPL-MCNC: 147 MG/DL (ref ?–200)
CO2 SERPL-SCNC: 28 MMOL/L (ref 21–32)
CREAT BLD-MCNC: 0.73 MG/DL
CREAT UR-SCNC: 139 MG/DL
EST. AVERAGE GLUCOSE BLD GHB EST-MCNC: 114 MG/DL (ref 68–126)
FASTING PATIENT LIPID ANSWER: YES
FASTING STATUS PATIENT QL REPORTED: YES
GFR SERPLBLD BASED ON 1.73 SQ M-ARVRAT: 106 ML/MIN/1.73M2 (ref 60–?)
GLOBULIN PLAS-MCNC: 4.1 G/DL (ref 2.8–4.4)
GLUCOSE BLD-MCNC: 72 MG/DL (ref 70–99)
HBA1C MFR BLD: 5.6 % (ref ?–5.7)
HDLC SERPL-MCNC: 44 MG/DL (ref 40–59)
LDLC SERPL CALC-MCNC: 85 MG/DL (ref ?–100)
MICROALBUMIN UR-MCNC: 0.86 MG/DL
MICROALBUMIN/CREAT 24H UR-RTO: 6.2 UG/MG (ref ?–30)
NONHDLC SERPL-MCNC: 103 MG/DL (ref ?–130)
OSMOLALITY SERPL CALC.SUM OF ELEC: 296 MOSM/KG (ref 275–295)
POTASSIUM SERPL-SCNC: 4.4 MMOL/L (ref 3.5–5.1)
PROT SERPL-MCNC: 7.6 G/DL (ref 6.4–8.2)
SODIUM SERPL-SCNC: 144 MMOL/L (ref 136–145)
TRIGL SERPL-MCNC: 95 MG/DL (ref 30–149)
VLDLC SERPL CALC-MCNC: 15 MG/DL (ref 0–30)

## 2023-01-07 ENCOUNTER — PATIENT MESSAGE (OUTPATIENT)
Dept: INTERNAL MEDICINE CLINIC | Facility: CLINIC | Age: 43
End: 2023-01-07

## 2023-01-07 ENCOUNTER — TELEPHONE (OUTPATIENT)
Dept: ORTHOPEDICS CLINIC | Facility: CLINIC | Age: 43
End: 2023-01-07

## 2023-01-07 DIAGNOSIS — E11.9 TYPE 2 DIABETES MELLITUS WITHOUT COMPLICATION, WITHOUT LONG-TERM CURRENT USE OF INSULIN (HCC): ICD-10-CM

## 2023-01-07 DIAGNOSIS — Z51.81 ENCOUNTER FOR THERAPEUTIC DRUG MONITORING: ICD-10-CM

## 2023-01-07 DIAGNOSIS — E66.01 OBESITY, MORBID, BMI 40.0-49.9 (HCC): ICD-10-CM

## 2023-01-07 NOTE — TELEPHONE ENCOUNTER
Patient is coming in for \"Ball in left knee\" . Patient had imaging done,Imaging can be viewed in Epic. Please review imaging, and if further imaging is needed please place Rx .   Future Appointments   Date Time Provider John Kathleen   1/25/2023  1:00 PM LACEY Hansen [facility]   1/27/2023  8:00 AM Cata Monroy MD [facility] [id number]

## 2023-01-08 RX ORDER — DULAGLUTIDE 1.5 MG/.5ML
1.5 INJECTION, SOLUTION SUBCUTANEOUS WEEKLY
Qty: 4 PEN | Refills: 0 | Status: SHIPPED
Start: 2023-01-08

## 2023-01-12 ENCOUNTER — PATIENT MESSAGE (OUTPATIENT)
Dept: INTERNAL MEDICINE CLINIC | Facility: CLINIC | Age: 43
End: 2023-01-12

## 2023-01-25 ENCOUNTER — OFFICE VISIT (OUTPATIENT)
Dept: INTERNAL MEDICINE CLINIC | Facility: CLINIC | Age: 43
End: 2023-01-25
Payer: MEDICAID

## 2023-01-25 VITALS
RESPIRATION RATE: 16 BRPM | WEIGHT: 238 LBS | HEIGHT: 66 IN | OXYGEN SATURATION: 98 % | BODY MASS INDEX: 38.25 KG/M2 | SYSTOLIC BLOOD PRESSURE: 116 MMHG | DIASTOLIC BLOOD PRESSURE: 70 MMHG | HEART RATE: 63 BPM

## 2023-01-25 DIAGNOSIS — I10 HYPERTENSION, UNSPECIFIED TYPE: ICD-10-CM

## 2023-01-25 DIAGNOSIS — E66.01 OBESITY, MORBID, BMI 40.0-49.9 (HCC): ICD-10-CM

## 2023-01-25 DIAGNOSIS — Z51.81 ENCOUNTER FOR THERAPEUTIC DRUG MONITORING: Primary | ICD-10-CM

## 2023-01-25 DIAGNOSIS — E11.9 TYPE 2 DIABETES MELLITUS WITHOUT COMPLICATION, WITHOUT LONG-TERM CURRENT USE OF INSULIN (HCC): ICD-10-CM

## 2023-01-25 PROCEDURE — 3074F SYST BP LT 130 MM HG: CPT | Performed by: NURSE PRACTITIONER

## 2023-01-25 PROCEDURE — 99213 OFFICE O/P EST LOW 20 MIN: CPT | Performed by: NURSE PRACTITIONER

## 2023-01-25 PROCEDURE — 3008F BODY MASS INDEX DOCD: CPT | Performed by: NURSE PRACTITIONER

## 2023-01-25 PROCEDURE — 3078F DIAST BP <80 MM HG: CPT | Performed by: NURSE PRACTITIONER

## 2023-01-25 RX ORDER — DULAGLUTIDE 3 MG/.5ML
3 INJECTION, SOLUTION SUBCUTANEOUS WEEKLY
Qty: 3 ML | Refills: 0 | Status: SHIPPED | OUTPATIENT
Start: 2023-01-25

## 2023-01-25 NOTE — PATIENT INSTRUCTIONS
Next steps:  1. Fill your prescribed medication and take as discussed and prescribed: increase trulicity 3mg weekly   2. Schedule a personal nutrition consultation with one of our registered dieticians     Please try to work on the following dietary changes:    1. Drink water with meals and throughout the day, cut down on soda and/or juice if consumed. Consider flavored water options like Bubbly, Spindrift, Hint and Leia. 2.  Eat breakfast daily and focus on having protein with each meal, examples include: greek yogurt, cottage cheese, hard boiled egg, whole grain toast with peanut butter. 3.  Reduce refined carbohydrates and sugars which includes items such as sweets, as well as rice, pasta, and bread and make sure to choose whole grain options when having them with just 1 serving per meal about the size of your inner palm. 4.  Consume non starchy veggies daily working towards making them a good 50% of your daily food intake. Add them to lunch and dinner consistently. 5.  Start a daily probiotic: VSL#3 is recommended, (order on line at www.vsl3. com). Take 1 capsule daily with water for 30 days, then reduce to 1 every other day (this will reduce the cost). Capsules can be left out for 2 weeks, but then must be refrigerated. Please download luis enrique My Fitness Simón Coombs Or Net Diary to monitor daily dietary intake and you will be able to see if you are eating the right amount of calories or too much or too little which would hinder weight loss. Additionally this will help to see your daily carbohydrate and protein intake. When you set the luis enrique up choose 1-2 lbs/week as a goal.  Keeping a paper food journal is an option as well to remain accountable for your choices- this is the start to mindful eating! A low calorie diet has been consistently shown to support weight loss. Continue or start exercising to help establish a routine.  If not already exercising begin with 1 day and progress as able with long-term goal of 30 minutes 5 days a week at a minimum. Meditation daily can help manage and control stress. Chronic stress can make weight loss difficult. Exercising is one way to help with stress, but meditation using the CALM Kassi or another comparable alternative can be done in your home or place of work with little time commitment. This Kassi can also help work on behavior change and improve sleep. Check out the segment under Calm Masterclass and listen to The 4 Pillars of Health. A great way to begin learning about the foundation of lifestyle with practical tips to use in your every day. Check out www.yourweightmatters. org blog for continued daily support and education along this weight loss journey! Patient Resources:     Personal Training/Fitness Classes/Health Coaching     Traci 112 and Almaraz Sophiaside @ [email or web address]/ Full fitness center with group fitness and personal training. Discount available as client of [facility]. Health Coaching and Personal Training with Ethan Grossman at our [facility]- individual weekly coaching with option to add personal training and small group fitness classes targeted at weight loss- [phone] and/or email @ [email or web address]  360FIT Yarnell [email or web address] Group Fitness [phone] and/or email Kvng brooks at [email or web address]  [address] [facility] with multiple locations: Aetna (www.iTMan), Eat The Hyperfair Fitness (www.Gen4 Energy. TVplus), Fit Body Bootcamp (www.Sentinel Technologiesbodybootcamp.TVplus), Make My plate (www.CleanScapes. TVplus), The Exercise  (www.exercisecoach.TVplus)     Online Fitness  Fitness  on Whole Foods in 10 DVD series- www. wvinb00IMU. TVplus  Sit and Be Fit - Chair exercise series Www.sitandbefit. org  Hip Hop Fit with Quan Marquez at www.hiphopfit. net     Apps for on the Bank [employer] 7 Minute Workout (orange box with white 7) - free on the go HIIT training kassi  Peloton Kassi @ www. MyMusic     Nutrition Trackers and Tools  LoseIT! And My Fitness Pal apps and on line for tracking nutrition  NOOM - virtual health coaching  FitFoundation (healthy meals on the go) in [facility] @ www. nrkbskxkqpdyp3j. Vern January MD @ www.bistromd.com and Yvonne Purvis (keto and low carb plans recommended) @ www. QHXWZE44.TVW, Metabolic Meals @ www. TakeCareMetabolicMeals. com - individual prepared meals to go  Trubion Pharmaceuticals, Catch Media, International Business Machines, Every Plate, VERTILAS- on line meal delivery programs for preparation at home  AK IForem in [place] for homemade meals to go @ www.Dayjet. Smart Ecosystems  Diet Doctor @ www. dietdoctor. Smart Ecosystems - low carb swaps  YuOpposing Views - meal prep and planning kassi (www.yummly. com)     Stress Management/Behavior/Mindful Eating  CALM meditation kassi (www.MedAvail)  Headspace  Am I Hungry? Mindful eating virtual  kassi  Www.yourweightmatters. org - Obesity Action Coalition sponsored Blog posts daily  Motivation kassi (black box with white \")- daily supportive messages sent to your phone     Books/Video Education/Podcasts  Mindless Eating by Dany Coreasort  Why We Get Sick by Sydnie Stroud (a book about insulin resistance)  Atomic Habits by Mina Ann (a book about taking small steps to promote greater behavior change)   Can't Hurt Me by Crescencio Parrish (a book exploring the power of discipline in achieving your goals)  The End of Dieting: How to Live for Life by Dr. Bonny Martin M.D. or listen to The [address] [facility] [address] Episode 61: Understanding \"Nutritarian\" Eating w/Dr. Bonny Martin  Your Body in Balance: The World Fuel Services Corporation of Food, Hormones, and Health by Dr. Adelita Gutierrez  The Menopause Diet Plan by Yahir [facility] and [facility]OSP AT [facility]  The Complete Guide to fasting by Dr. Nathan Galan, [facility] by Robert Kwok, Ph.D, R.D.   Weight Loss Surgery Will Not Treat Food Addiction by Petra Boone Ph.D  The Game Changers- Brenda Documentary on plant based nutrition  Fed Up - documentary about obesity (Free on Utube)  The Truth About Sugar - documentary on sugar (Free on Utube, https://youtu. be/5O3qslrHM3q)  The Dr. Mariya Krueger by Dr. Marita Chao MD  Fitlosophy Fitspiration - journal to better health (found at [employer] in fitness aisle)  What Happened to You?- a look at the impact trauma has on behavior written by Usha Carlisle and Dr. Kate Ours Again by Stephania Chua - discovering your true self after trauma  Hamida Engel talk on The Easou Technology, The Call to Courage  Podcasts: The Exam Room by the Physician's Committee, Nutrition Facts by Dr. Winnie Pearl    We are here to support you with weight loss, but please remember that you still need your primary care provider for your routine health maintenance.

## 2023-01-27 ENCOUNTER — OFFICE VISIT (OUTPATIENT)
Dept: ORTHOPEDICS CLINIC | Facility: CLINIC | Age: 43
End: 2023-01-27
Payer: MEDICAID

## 2023-01-27 VITALS — WEIGHT: 238 LBS | HEIGHT: 66 IN | BODY MASS INDEX: 38.25 KG/M2

## 2023-01-27 DIAGNOSIS — S83.207A POSITIVE MCMURRAY TEST OF LEFT KNEE, INITIAL ENCOUNTER: Primary | ICD-10-CM

## 2023-01-27 PROCEDURE — 99204 OFFICE O/P NEW MOD 45 MIN: CPT | Performed by: ORTHOPAEDIC SURGERY

## 2023-01-27 PROCEDURE — 3008F BODY MASS INDEX DOCD: CPT | Performed by: ORTHOPAEDIC SURGERY

## 2023-02-22 ENCOUNTER — PATIENT MESSAGE (OUTPATIENT)
Dept: INTERNAL MEDICINE CLINIC | Facility: CLINIC | Age: 43
End: 2023-02-22

## 2023-02-25 DIAGNOSIS — E11.9 TYPE 2 DIABETES MELLITUS WITHOUT COMPLICATION, WITHOUT LONG-TERM CURRENT USE OF INSULIN (HCC): ICD-10-CM

## 2023-02-27 RX ORDER — BLOOD SUGAR DIAGNOSTIC
STRIP MISCELLANEOUS
Qty: 100 EACH | Refills: 0 | Status: SHIPPED | OUTPATIENT
Start: 2023-02-27

## 2023-02-27 RX ORDER — BLOOD SUGAR DIAGNOSTIC
STRIP MISCELLANEOUS
Qty: 100 EACH | Refills: 0 | OUTPATIENT
Start: 2023-02-27

## 2023-04-20 ENCOUNTER — OFFICE VISIT (OUTPATIENT)
Dept: FAMILY MEDICINE CLINIC | Facility: CLINIC | Age: 43
End: 2023-04-20
Payer: MEDICAID

## 2023-04-20 VITALS
TEMPERATURE: 97 F | SYSTOLIC BLOOD PRESSURE: 110 MMHG | OXYGEN SATURATION: 98 % | HEART RATE: 83 BPM | WEIGHT: 234.13 LBS | DIASTOLIC BLOOD PRESSURE: 80 MMHG | RESPIRATION RATE: 16 BRPM | HEIGHT: 66 IN | BODY MASS INDEX: 37.63 KG/M2

## 2023-04-20 DIAGNOSIS — M54.2 CERVICAL PAIN: Primary | ICD-10-CM

## 2023-04-20 DIAGNOSIS — F41.9 ANXIETY: ICD-10-CM

## 2023-04-20 PROCEDURE — 3079F DIAST BP 80-89 MM HG: CPT | Performed by: FAMILY MEDICINE

## 2023-04-20 PROCEDURE — 3074F SYST BP LT 130 MM HG: CPT | Performed by: FAMILY MEDICINE

## 2023-04-20 PROCEDURE — 99214 OFFICE O/P EST MOD 30 MIN: CPT | Performed by: FAMILY MEDICINE

## 2023-04-20 PROCEDURE — 3008F BODY MASS INDEX DOCD: CPT | Performed by: FAMILY MEDICINE

## 2023-04-20 RX ORDER — MELOXICAM 7.5 MG/1
7.5 TABLET ORAL DAILY PRN
Qty: 15 TABLET | Refills: 0 | Status: SHIPPED | OUTPATIENT
Start: 2023-04-20

## 2023-04-20 RX ORDER — DULAGLUTIDE 0.75 MG/.5ML
INJECTION, SOLUTION SUBCUTANEOUS
COMMUNITY

## 2023-04-20 RX ORDER — FLUOXETINE 10 MG/1
10 TABLET, FILM COATED ORAL DAILY
Qty: 30 TABLET | Refills: 0 | Status: SHIPPED | OUTPATIENT
Start: 2023-04-20

## 2023-04-29 ENCOUNTER — HOSPITAL ENCOUNTER (OUTPATIENT)
Dept: GENERAL RADIOLOGY | Age: 43
Discharge: HOME | End: 2023-04-29
Attending: FAMILY MEDICINE
Payer: MEDICAID

## 2023-04-29 DIAGNOSIS — M54.2 CERVICAL PAIN: ICD-10-CM

## 2023-04-29 PROCEDURE — 72040 X-RAY EXAM NECK SPINE 2-3 VW: CPT | Performed by: FAMILY MEDICINE

## 2023-05-01 ENCOUNTER — PATIENT MESSAGE (OUTPATIENT)
Dept: INTERNAL MEDICINE CLINIC | Facility: CLINIC | Age: 43
End: 2023-05-01

## 2023-05-01 DIAGNOSIS — E11.9 TYPE 2 DIABETES MELLITUS WITHOUT COMPLICATION, WITHOUT LONG-TERM CURRENT USE OF INSULIN (HCC): Primary | ICD-10-CM

## 2023-05-02 RX ORDER — DULAGLUTIDE 1.5 MG/.5ML
1.5 INJECTION, SOLUTION SUBCUTANEOUS WEEKLY
Qty: 2 ML | Refills: 1 | Status: SHIPPED | OUTPATIENT
Start: 2023-05-02 | End: 2023-05-30

## 2023-05-02 NOTE — TELEPHONE ENCOUNTER
From: Gabriela Miguel  To: LACEY Hebert  Sent: 5/1/2023 2:06 PM CDT  Subject: Trulicity    Hello,    I just finished my dose of at 0.75mg and I would need a refill for my next dose up.      Thank you,  Gabriela Miguel

## 2023-05-02 NOTE — TELEPHONE ENCOUNTER
Requesting trulicity increase  LOV: 1/25/23  RTC: 7 weeks  Last Relevant Labs: 1/2/23  Filled: 3/10/23 #2ml with 1 refills    Future Appointments   Date Time Provider John Wang   5/16/2023 10:40 AM Ollie Primrose, APRN EMG[facility]I [facility]

## 2023-06-04 DIAGNOSIS — I10 PRIMARY HYPERTENSION: ICD-10-CM

## 2023-06-04 DIAGNOSIS — R00.2 PALPITATIONS: ICD-10-CM

## 2023-06-05 RX ORDER — OLMESARTAN MEDOXOMIL 20 MG/1
TABLET ORAL
Qty: 30 TABLET | Refills: 0 | Status: SHIPPED | OUTPATIENT
Start: 2023-06-05

## 2023-06-05 NOTE — TELEPHONE ENCOUNTER
Requested Renewals     Name from pharmacy: Olmesartan Medoxomil 20 MG Oral Tablet         Will file in chart as: OLMESARTAN 20 MG Oral Tab    Sig: Take 1 tablet by mouth once daily    Disp: 90 tablet    Refills: 0    Start: 6/4/2023    Class: Normal    Non-formulary For: Palpitations, Primary hypertension    Last ordered: 5 months ago by Tomi Bryan DO Last refill: 2/26/2023    Rx #: [id number]    Hypertension Medications Protocol Yhqobv7806/04/2023 10:40 AM   Protocol Details CMP or BMP in past 12 months    Last serum creatinine< 2.0    Appointment in past 6 or next 3 months             Future Appointments   Date Time Provider John Wang   7/26/2023 11:40 AM LACEY EspinalI EMG [facility] [address]     LOV: 4/20/23  Last physical done 8/10/21      AppPowerGroupt message sent to patient to schedule annual exam.  RX sent.

## 2023-06-21 DIAGNOSIS — E11.9 TYPE 2 DIABETES MELLITUS WITHOUT COMPLICATION, WITHOUT LONG-TERM CURRENT USE OF INSULIN (HCC): ICD-10-CM

## 2023-07-03 RX ORDER — DULAGLUTIDE 1.5 MG/.5ML
INJECTION, SOLUTION SUBCUTANEOUS
Qty: 4 ML | Refills: 0 | Status: SHIPPED | OUTPATIENT
Start: 2023-07-03

## 2023-07-04 DIAGNOSIS — R00.2 PALPITATIONS: ICD-10-CM

## 2023-07-04 DIAGNOSIS — I10 PRIMARY HYPERTENSION: ICD-10-CM

## 2023-07-05 RX ORDER — OLMESARTAN MEDOXOMIL 20 MG/1
TABLET ORAL
Qty: 30 TABLET | Refills: 0 | Status: SHIPPED | OUTPATIENT
Start: 2023-07-05

## 2023-07-05 NOTE — TELEPHONE ENCOUNTER
Name from pharmacy: Olmesartan Medoxomil 20 MG Oral Tablet          Will file in chart as: OLMESARTAN 20 MG Oral Tab    Sig: Take 1 tablet by mouth once daily    Disp: 30 tablet    Refills: 0    Start: 7/4/2023    Class: Normal    Non-formulary For: Palpitations, Primary hypertension    Last ordered: 1 month ago by Bita Patel DO Last refill: 6/6/2023    Rx #: [id number]    Hypertension Medications Protocol Gdsnvs0707/04/2023 09:21 AM   Protocol Details CMP or BMP in past 12 months    Last serum creatinine< 2.0    Appointment in past 6 or next 3 months      To be filled at: [address] [place] [address] (N), [address] [phone], [phone]

## 2023-07-13 ENCOUNTER — TELEPHONE (OUTPATIENT)
Dept: FAMILY MEDICINE CLINIC | Facility: CLINIC | Age: 43
End: 2023-07-13

## 2023-07-14 NOTE — TELEPHONE ENCOUNTER
Received fax from [facility], Olmesartan has been denied. They state patient must have tried for at least 2 weeks. Preferred drugs are:  Benazepril (tablets)  Captopril (tablets)  Enalapril (tablets)  Fosinopril (tablets)  Lisinopril (tablets)  Moexipril (tablets)  Quinapril (tablets)  Ramipril (tablets)  Trandolapril (tablets)  Irbesartan (tablets)  Losartan (tablets)  Valsartan (tablets)    Patient has Lisinopril listed as an allergy. -please advise.

## 2023-07-17 RX ORDER — LOSARTAN POTASSIUM 25 MG/1
25 TABLET ORAL DAILY
Qty: 90 TABLET | Refills: 1 | Status: SHIPPED | OUTPATIENT
Start: 2023-07-17 | End: 2024-01-13

## 2023-07-22 DIAGNOSIS — E11.9 TYPE 2 DIABETES MELLITUS WITHOUT COMPLICATION, WITHOUT LONG-TERM CURRENT USE OF INSULIN (HCC): ICD-10-CM

## 2023-07-24 RX ORDER — BLOOD SUGAR DIAGNOSTIC
STRIP MISCELLANEOUS
Qty: 100 EACH | Refills: 0 | Status: SHIPPED | OUTPATIENT
Start: 2023-07-24

## 2023-07-24 NOTE — TELEPHONE ENCOUNTER
Requested Renewals     Name from pharmacy: [facility] [address] [place] [address]         Will file in chart as: Claudean Desmond In Vitro Strip    Sig: USE 1 STRIP TO CHECK GLUCOSE ONCE DAILY    Disp: 100 each    Refills: 0    Start: 7/22/2023    Class: Normal    For: Type 2 diabetes mellitus without complication, without long-term current use of insulin ([address]    Last ordered: 4 months ago by Randal Cary DO Last refill: 4/23/2023    Rx #: [id number]    Diabetic Supplies Protocol Btbuyh3007/22/2023 09:16 AM    Appointment in the past 12 or next 3 months             Future Appointments   Date Time Provider John Wang   7/26/2023 11:40 AM LACEY Manning EMG [facility]     LOV: 4/20/23  Last labs done 1/2/23    -please call patient to schedule annual exam.   RX sent.

## 2023-07-26 ENCOUNTER — OFFICE VISIT (OUTPATIENT)
Dept: INTERNAL MEDICINE CLINIC | Facility: CLINIC | Age: 43
End: 2023-07-26
Payer: MEDICAID

## 2023-07-26 VITALS
HEIGHT: 66 IN | BODY MASS INDEX: 38.73 KG/M2 | OXYGEN SATURATION: 98 % | HEART RATE: 86 BPM | SYSTOLIC BLOOD PRESSURE: 124 MMHG | DIASTOLIC BLOOD PRESSURE: 76 MMHG | RESPIRATION RATE: 16 BRPM | WEIGHT: 241 LBS

## 2023-07-26 DIAGNOSIS — I10 HYPERTENSION, UNSPECIFIED TYPE: ICD-10-CM

## 2023-07-26 DIAGNOSIS — E66.01 OBESITY, MORBID, BMI 40.0-49.9 (HCC): ICD-10-CM

## 2023-07-26 DIAGNOSIS — F43.9 STRESS: ICD-10-CM

## 2023-07-26 DIAGNOSIS — Z51.81 ENCOUNTER FOR THERAPEUTIC DRUG MONITORING: Primary | ICD-10-CM

## 2023-07-26 DIAGNOSIS — E11.9 TYPE 2 DIABETES MELLITUS WITHOUT COMPLICATION, WITHOUT LONG-TERM CURRENT USE OF INSULIN (HCC): ICD-10-CM

## 2023-07-26 PROCEDURE — 3074F SYST BP LT 130 MM HG: CPT | Performed by: NURSE PRACTITIONER

## 2023-07-26 PROCEDURE — 3008F BODY MASS INDEX DOCD: CPT | Performed by: NURSE PRACTITIONER

## 2023-07-26 PROCEDURE — 99213 OFFICE O/P EST LOW 20 MIN: CPT | Performed by: NURSE PRACTITIONER

## 2023-07-26 PROCEDURE — 3078F DIAST BP <80 MM HG: CPT | Performed by: NURSE PRACTITIONER

## 2023-07-26 RX ORDER — DULAGLUTIDE 1.5 MG/.5ML
1.5 INJECTION, SOLUTION SUBCUTANEOUS WEEKLY
Qty: 6 ML | Refills: 0 | Status: SHIPPED | OUTPATIENT
Start: 2023-07-26 | End: 2023-08-23

## 2023-07-26 RX ORDER — PHENTERMINE HYDROCHLORIDE 15 MG/1
15 CAPSULE ORAL EVERY MORNING
Qty: 30 CAPSULE | Refills: 2 | Status: SHIPPED | OUTPATIENT
Start: 2023-07-26

## 2023-07-26 NOTE — PATIENT INSTRUCTIONS
Next steps:  1. Fill your prescribed medication and take as discussed and prescribed: trulicity 2.1QF weekly   Will start phentermine 15mg   2. Schedule a personal nutrition consultation with one of our registered dieticians     Please try to work on the following dietary changes:  Daily protein recommendation to start:  grams  Daily carbohydrate: <130g  Daily calories: 1,600-1,700  1. Drink water with meals and throughout the day, cut down on soda and/or juice if consumed. Consider flavored water options like Bubbly, Spindrift, Hint and Leia. 2.  Eat breakfast daily and focus on having protein with each meal, examples include: greek yogurt, cottage cheese, hard boiled egg, whole grain toast with peanut butter. 3.  Reduce refined carbohydrates and sugars which includes items such as sweets, as well as rice, pasta, and bread and make sure to choose whole grain options when having them with just 1 serving per meal about the size of your inner palm. 4.  Consume non starchy veggies daily working towards making them a good 50% of your daily food intake. Add them to lunch and dinner consistently. 5.  Start a daily probiotic: VSL#3 is recommended, (order on line at www.vsl3. com). Take 1 capsule daily with water for 30 days, then reduce to 1 every other day (this will reduce the cost). Capsules can be left out for 2 weeks, but then must be refrigerated. Please download luis enrique My Fitness Christ Kayser! Or Net Diary to monitor daily dietary intake and you will be able to see if you are eating the right amount of calories or too much or too little which would hinder weight loss. Additionally this will help to see your daily carbohydrate and protein intake. When you set the luis enrique up choose 1-2 lbs/week as a goal.  Keeping a paper food journal is an option as well to remain accountable for your choices- this is the start to mindful eating! A low calorie diet has been consistently shown to support weight loss. Continue or start exercising to help establish a routine. If not already exercising begin with 1 day and progress as able with long-term goal of 30 minutes 5 days a week at a minimum. Meditation daily can help manage and control stress. Chronic stress can make weight loss difficult. Exercising is one way to help with stress, but meditation using the CALM Kassi or another comparable alternative can be done in your home or place of work with little time commitment. This Kassi can also help work on behavior change and improve sleep. Check out the segment under Calm Masterclass and listen to The 4 Pillars of Health. A great way to begin learning about the foundation of lifestyle with practical tips to use in your every day. Check out www.yourweightmatters. org blog for continued daily support and education along this weight loss journey! Patient Resources:     Personal Training/Fitness Classes/Health Coaching     Traci Krueger and Almaraz Sophiaside @ [email or web address] Full fitness center with group fitness and personal training. Discount available as client of [facility]. Health Coaching and Personal Training with Alin Elizondo at our [facility]- individual weekly coaching with option to add personal training and small group fitness classes targeted at weight loss- [phone] and/or email @ [email or web address]  360FIT Jody [email or web address] Group Fitness [phone] and/or email Pau Sherwood at [email or web address]  [address] [facility] with multiple locations: [employer]et[employer] (www.Living Indie. Capshare Media), Eat The Frog Fitness (www.PA & Associates Healthcare. Capshare Media), Fit Body Bootcamp (www.Advanced Oncotherapybodybootcamp.Capshare Media), GiveMeSport Fitness (www.Vivint Solar. Capshare Media), The Exercise  (www.exercisecoach.Capshare Media)     Online Fitness  Fitness  on [employer] in 10 DVD series- www. vbcxt73ECG. Capshare Media  Sit and Be Fit - Chair exercise series Www.sitandbefit. org  Hip Hop Fit with Quan Marquez at www.hiphopfit. net     Apps for on the Tunaspot 7 Minute Workout (orange box with white 7) - free on the go HIIT training kassi  Peloton Kassi @ wwwCM Sistemi     Nutrition Trackers and Tools  LoseIT! And My Fitness Pal apps and on line for tracking nutrition  NOOM - virtual health coaching  FitFoundation (healthy meals on the go) in [facility] @ www. vkprcuzzzoehw5f. Moriah Yee MD @ wwwBioAtlantisomdNeurescue and Bora [place] (keto and low carb plans recommended) @ www. TEQKET37.NFU, Metabolic Meals @ www. Predictive Biosciencesals. com - individual prepared meals to go  Onavo, Organically Maid, [employer]ines, Every Plate, MYTRND- on line meal delivery programs for preparation at home  AK Twelve in [place] for homemade meals to go @ wwwSignNow. LOYAL3  Diet Doctor @ www. dietdoctor. com - low carb swaps  Yummly - meal prep and planning kassi (www.yummly. com)     Stress Management/Behavior/Mindful Eating  CALM meditation kassi (www.calm. LOYAL3)  Headspace  Am I Hungry? Mindful eating virtual  kassi  Www.yourweightmatters. org - Obesity Action Coalition sponsored Blog posts daily  Motivation kassi (black box with white \")- daily supportive messages sent to your phone     Books/Video Education/Podcasts  Mindless Eating by Marisol Shobha  Why We Get Sick by Junito Osborn (a book about insulin resistance)  Atomic Habits by Chong Jimenez (a book about taking small steps to promote greater behavior change)   Can't Hurt Me by Flavia Pak (a book exploring the power of discipline in achieving your goals)  The End of Dieting: How to Live for Life by Dr. Jacobo Maxwell M.D. or listen to The [address] Episode 61: Understanding \"Nutritarian\" Eating w/Dr. Jacobo Maxwell  Your Body in Balance:  The World [employer] of Food, Hormones, and Health by Dr. Lydia Feliciano  The Menopause Diet Plan by Juan Quintanilla and [facility] AT [facility]  The Complete Guide to fasting by Dr. Alexia Keller, [address] [place] [address] Fat by Robert Kwok, Ph.D, R.D. Weight Loss Surgery Will Not Treat Food Addiction by Petra Boone Ph.D  The [address] [place] [address] on plant based nutrition  Fed Up - documentary about obesity (Free on [employer])  The Truth About Sugar - documentary on sugar (Free on Utube, https://youtu. be/2O8mcwqBO7u)  The Dr. Sharmila Plata by Dr. Ning Singh MD  Fitlosophy Fitspiration - journal to better health (found at [employer] in fitness aisle)  What Happened to You?- a look at the impact trauma has on behavior written by Lu Calabrese and Dr. Yolis Mccall Again by Eliel Alonso - discovering your true self after trauma  Yonas Xie talk on norin.tv, The Call to MyGeekDay  Podcasts: The Exam Room by the Physician's Committee, Nutrition Facts by Dr. Ramirez Lively    We are here to support you with weight loss, but please remember that you still need your primary care provider for your routine health maintenance.

## 2023-08-18 DIAGNOSIS — I10 PRIMARY HYPERTENSION: ICD-10-CM

## 2023-08-18 DIAGNOSIS — R00.2 PALPITATIONS: ICD-10-CM

## 2023-08-18 DIAGNOSIS — E11.9 TYPE 2 DIABETES MELLITUS WITHOUT COMPLICATION, WITHOUT LONG-TERM CURRENT USE OF INSULIN (HCC): Primary | ICD-10-CM

## 2023-08-18 RX ORDER — OLMESARTAN MEDOXOMIL 20 MG/1
TABLET ORAL
Qty: 30 TABLET | Refills: 0 | Status: SHIPPED | OUTPATIENT
Start: 2023-08-18 | End: 2023-08-29

## 2023-10-17 ENCOUNTER — PATIENT MESSAGE (OUTPATIENT)
Dept: FAMILY MEDICINE CLINIC | Facility: CLINIC | Age: 43
End: 2023-10-17

## 2023-10-17 DIAGNOSIS — E11.9 TYPE 2 DIABETES MELLITUS WITHOUT COMPLICATION, WITHOUT LONG-TERM CURRENT USE OF INSULIN (HCC): ICD-10-CM

## 2023-10-17 RX ORDER — BLOOD SUGAR DIAGNOSTIC
STRIP MISCELLANEOUS
Qty: 100 EACH | Refills: 0 | OUTPATIENT
Start: 2023-10-17

## 2023-10-17 NOTE — TELEPHONE ENCOUNTER
From: Elana Figueredo  To: Roselia Amos  Sent: 10/17/2023 1:22 PM CDT  Subject: Medication     Hello,    I was just wonder Why are all my medication refills being denied? ?      Thank you,  Elana Figueredo

## 2023-10-17 NOTE — TELEPHONE ENCOUNTER
Medication(s) to Refill:   Requested Prescriptions     Pending Prescriptions Disp Refills    ONETOUCH ULTRA In Vitro Strip [place] Med Name: OneTouch Ultra Blue In Vitro Strip] 100 each 0     Sig: USE 1 STRIP TO CHECK GLUCOSE ONCE DAILY         Reason for Medication Refill being sent to Provider / Reason Protocol Failed:  [] 90 day refill has already been granted  [] Blood Pressure out of range  [] Labs Abnormal/over due  [] Medication not previously prescribed by Provider  [] Non-Protocol Medication  [] Controlled Substance   [x] Due for appointment- no future appointment scheduled  [] No Follow up specified      Last Time Medication was Filled:  7/24/23    Last Office Visit with PCP: 4/20/23    When Patient was Due Back to the Office:  4 weeks   (from when PCP last addressed condition)    Future Appointments:  No future appointments.       Last Blood Pressures:  BP Readings from Last 2 Encounters:  07/26/23 : 124/76  04/20/23 : 110/80        Recent Labs: 1/2/23        Action taken:  [] Refill approved per protocol  [] Routing to provider for approval

## 2023-10-17 NOTE — TELEPHONE ENCOUNTER
Looks like pt. has made appt . with  for 11-28-23 H/O colonoscopy    H/O right knee surgery  ACL tear of right knee in 1997

## 2023-10-19 RX ORDER — BLOOD SUGAR DIAGNOSTIC
STRIP MISCELLANEOUS
Qty: 100 EACH | Refills: 0 | Status: SHIPPED | OUTPATIENT
Start: 2023-10-19

## 2023-11-03 ENCOUNTER — PATIENT MESSAGE (OUTPATIENT)
Dept: FAMILY MEDICINE CLINIC | Facility: CLINIC | Age: 43
End: 2023-11-03

## 2023-11-03 NOTE — TELEPHONE ENCOUNTER
From: Yuan Adames  To: Sohan Hanson  Sent: 11/3/2023 1:15 PM CDT  Subject: Hello    Upon doing a self examination I found a lump in my left breast behind my nipple which cause a shooting pain to my whole breast area. I tried to make an appointment and nothing is available until dec. Harsh Hope and I do have an appointment on Nov 28 for a annual physical is it ok to wait that long or can I get an appointment sooner?     Thank you,  Yuan Adames

## 2023-11-04 DIAGNOSIS — E11.9 TYPE 2 DIABETES MELLITUS WITHOUT COMPLICATION, WITHOUT LONG-TERM CURRENT USE OF INSULIN (HCC): ICD-10-CM

## 2023-11-06 RX ORDER — DULAGLUTIDE 1.5 MG/.5ML
1 INJECTION, SOLUTION SUBCUTANEOUS
Qty: 4 ML | Refills: 0 | Status: SHIPPED | OUTPATIENT
Start: 2023-11-06

## 2023-11-06 NOTE — TELEPHONE ENCOUNTER
Requesting   Requested Prescriptions     Pending Prescriptions Disp Refills    TRULICITY 1.5 NH/5.0DN Subcutaneous Solution Pen-injector [Pharmacy Med Name: Trulicity 1.5 NG/7.7QD Subcutaneous Solution Pen-injector] 12 mL 0     Sig: INJECT 1 SYRINGE SUBCUTANEOUSLY ONCE A WEEK     LOV: 7/26/23  RTC: 3 months  Filled: 7/3/23 #4 with 0 refills    Future Appointments   Date Time Provider John Wang   11/7/2023 12:45 PM Jordana Rangel, DO [facility] [address] [facility] [address]   11/28/2023  9:00 AM Linda Madrigal, DO [facility] [address] [facility] [address]

## 2023-11-07 ENCOUNTER — OFFICE VISIT (OUTPATIENT)
Dept: FAMILY MEDICINE CLINIC | Facility: CLINIC | Age: 43
End: 2023-11-07
Payer: MEDICAID

## 2023-11-07 VITALS
OXYGEN SATURATION: 98 % | HEART RATE: 78 BPM | SYSTOLIC BLOOD PRESSURE: 120 MMHG | BODY MASS INDEX: 39.05 KG/M2 | WEIGHT: 243 LBS | HEIGHT: 66 IN | RESPIRATION RATE: 18 BRPM | DIASTOLIC BLOOD PRESSURE: 74 MMHG | TEMPERATURE: 98 F

## 2023-11-07 DIAGNOSIS — N64.4 BREAST PAIN: Primary | ICD-10-CM

## 2023-11-07 DIAGNOSIS — Z12.31 VISIT FOR SCREENING MAMMOGRAM: ICD-10-CM

## 2023-11-07 DIAGNOSIS — N64.52 BREAST DISCHARGE: ICD-10-CM

## 2023-11-07 DIAGNOSIS — E11.9 TYPE 2 DIABETES MELLITUS WITHOUT COMPLICATION, WITHOUT LONG-TERM CURRENT USE OF INSULIN (HCC): ICD-10-CM

## 2023-11-07 DIAGNOSIS — I10 BENIGN ESSENTIAL HYPERTENSION: ICD-10-CM

## 2023-11-07 DIAGNOSIS — Z23 NEED FOR IMMUNIZATION AGAINST INFLUENZA: ICD-10-CM

## 2023-11-07 DIAGNOSIS — Z12.31 BREAST CANCER SCREENING BY MAMMOGRAM: ICD-10-CM

## 2023-11-07 LAB
CARTRIDGE EXPIRATION DATE: ABNORMAL DATE
CARTRIDGE LOT#: ABNORMAL NUMERIC
HEMOGLOBIN A1C: 5.7 % (ref 4.3–5.6)

## 2023-11-07 PROCEDURE — 3008F BODY MASS INDEX DOCD: CPT | Performed by: FAMILY MEDICINE

## 2023-11-07 PROCEDURE — 3078F DIAST BP <80 MM HG: CPT | Performed by: FAMILY MEDICINE

## 2023-11-07 PROCEDURE — 3044F HG A1C LEVEL LT 7.0%: CPT | Performed by: FAMILY MEDICINE

## 2023-11-07 PROCEDURE — 3074F SYST BP LT 130 MM HG: CPT | Performed by: FAMILY MEDICINE

## 2023-11-07 PROCEDURE — 99214 OFFICE O/P EST MOD 30 MIN: CPT | Performed by: FAMILY MEDICINE

## 2023-11-07 PROCEDURE — 83036 HEMOGLOBIN GLYCOSYLATED A1C: CPT | Performed by: FAMILY MEDICINE

## 2023-11-07 PROCEDURE — 90471 IMMUNIZATION ADMIN: CPT | Performed by: FAMILY MEDICINE

## 2023-11-07 PROCEDURE — 90686 IIV4 VACC NO PRSV 0.5 ML IM: CPT | Performed by: FAMILY MEDICINE

## 2023-11-07 RX ORDER — OLMESARTAN MEDOXOMIL 20 MG/1
20 TABLET ORAL DAILY
COMMUNITY
End: 2023-11-07

## 2023-11-07 RX ORDER — OLMESARTAN MEDOXOMIL 20 MG/1
20 TABLET ORAL DAILY
Qty: 90 TABLET | Refills: 0 | Status: SHIPPED | OUTPATIENT
Start: 2023-11-07 | End: 2023-11-10

## 2023-11-07 RX ORDER — DULAGLUTIDE 1.5 MG/.5ML
INJECTION, SOLUTION SUBCUTANEOUS
COMMUNITY

## 2023-11-08 NOTE — TELEPHONE ENCOUNTER
See pt message, olmesartan 20 mg and hydrochlorothiazide 12.5 mg separate prescriptions pended for your review as you have no previously prescribed for pt.      OV 8/20/2022 no

## 2023-11-10 ENCOUNTER — PATIENT MESSAGE (OUTPATIENT)
Dept: FAMILY MEDICINE CLINIC | Facility: CLINIC | Age: 43
End: 2023-11-10

## 2023-11-10 DIAGNOSIS — I10 BENIGN ESSENTIAL HYPERTENSION: ICD-10-CM

## 2023-11-10 RX ORDER — OLMESARTAN MEDOXOMIL 20 MG/1
20 TABLET ORAL DAILY
Qty: 90 TABLET | Refills: 0 | Status: SHIPPED | OUTPATIENT
Start: 2023-11-10

## 2023-11-10 RX ORDER — OLMESARTAN MEDOXOMIL 20 MG/1
20 TABLET ORAL DAILY
Qty: 30 TABLET | Refills: 0 | OUTPATIENT
Start: 2023-11-10

## 2023-11-13 ENCOUNTER — TELEPHONE (OUTPATIENT)
Dept: FAMILY MEDICINE CLINIC | Facility: CLINIC | Age: 43
End: 2023-11-13

## 2023-11-13 NOTE — TELEPHONE ENCOUNTER
Recd request from Cover My Meds that a PA is needed for the Olmesartan Medoxomil 20 mg tablets. Placed in MA folder.

## 2023-11-16 NOTE — TELEPHONE ENCOUNTER
This medication has been denied multiple times by insurance. LMCM with patient that GoodRx at [place] cost 15.86 for a 90 day supply. Waiting for response.

## 2023-11-28 ENCOUNTER — LAB ENCOUNTER (OUTPATIENT)
Dept: LAB | Age: 43
End: 2023-11-28
Attending: FAMILY MEDICINE
Payer: MEDICAID

## 2023-11-28 ENCOUNTER — OFFICE VISIT (OUTPATIENT)
Dept: FAMILY MEDICINE CLINIC | Facility: CLINIC | Age: 43
End: 2023-11-28
Payer: MEDICAID

## 2023-11-28 VITALS
HEART RATE: 76 BPM | HEIGHT: 66 IN | SYSTOLIC BLOOD PRESSURE: 120 MMHG | DIASTOLIC BLOOD PRESSURE: 82 MMHG | RESPIRATION RATE: 16 BRPM | BODY MASS INDEX: 47.09 KG/M2 | WEIGHT: 293 LBS | TEMPERATURE: 98 F

## 2023-11-28 DIAGNOSIS — E11.9 TYPE 2 DIABETES MELLITUS WITHOUT COMPLICATION, WITHOUT LONG-TERM CURRENT USE OF INSULIN (HCC): ICD-10-CM

## 2023-11-28 DIAGNOSIS — Z12.4 ROUTINE CERVICAL SMEAR: ICD-10-CM

## 2023-11-28 DIAGNOSIS — Z01.419 WELL WOMAN EXAM WITH ROUTINE GYNECOLOGICAL EXAM: Primary | ICD-10-CM

## 2023-11-28 DIAGNOSIS — Z12.4 SCREENING FOR CERVICAL CANCER: ICD-10-CM

## 2023-11-28 DIAGNOSIS — I10 PRIMARY HYPERTENSION: ICD-10-CM

## 2023-11-28 LAB
ALBUMIN SERPL-MCNC: 3.6 G/DL (ref 3.4–5)
ALBUMIN/GLOB SERPL: 0.8 {RATIO} (ref 1–2)
ALP LIVER SERPL-CCNC: 91 U/L
ALT SERPL-CCNC: 17 U/L
ANION GAP SERPL CALC-SCNC: 4 MMOL/L (ref 0–18)
AST SERPL-CCNC: 13 U/L (ref 15–37)
BASOPHILS # BLD AUTO: 0.06 X10(3) UL (ref 0–0.2)
BASOPHILS NFR BLD AUTO: 0.6 %
BILIRUB SERPL-MCNC: 0.6 MG/DL (ref 0.1–2)
BUN BLD-MCNC: 14 MG/DL (ref 9–23)
CALCIUM BLD-MCNC: 9.1 MG/DL (ref 8.5–10.1)
CHLORIDE SERPL-SCNC: 105 MMOL/L (ref 98–112)
CHOLEST SERPL-MCNC: 155 MG/DL (ref ?–200)
CO2 SERPL-SCNC: 29 MMOL/L (ref 21–32)
CREAT BLD-MCNC: 0.74 MG/DL
EGFRCR SERPLBLD CKD-EPI 2021: 104 ML/MIN/1.73M2 (ref 60–?)
EOSINOPHIL # BLD AUTO: 0.13 X10(3) UL (ref 0–0.7)
EOSINOPHIL NFR BLD AUTO: 1.3 %
ERYTHROCYTE [DISTWIDTH] IN BLOOD BY AUTOMATED COUNT: 12.9 %
FASTING PATIENT LIPID ANSWER: YES
FASTING STATUS PATIENT QL REPORTED: YES
GLOBULIN PLAS-MCNC: 4.8 G/DL (ref 2.8–4.4)
GLUCOSE BLD-MCNC: 104 MG/DL (ref 70–99)
HCT VFR BLD AUTO: 44.7 %
HDLC SERPL-MCNC: 52 MG/DL (ref 40–59)
HGB BLD-MCNC: 14.8 G/DL
IMM GRANULOCYTES # BLD AUTO: 0.04 X10(3) UL (ref 0–1)
IMM GRANULOCYTES NFR BLD: 0.4 %
LDLC SERPL CALC-MCNC: 84 MG/DL (ref ?–100)
LYMPHOCYTES # BLD AUTO: 2.11 X10(3) UL (ref 1–4)
LYMPHOCYTES NFR BLD AUTO: 21.4 %
MCH RBC QN AUTO: 29.5 PG (ref 26–34)
MCHC RBC AUTO-ENTMCNC: 33.1 G/DL (ref 31–37)
MCV RBC AUTO: 89.2 FL
MONOCYTES # BLD AUTO: 0.47 X10(3) UL (ref 0.1–1)
MONOCYTES NFR BLD AUTO: 4.8 %
NEUTROPHILS # BLD AUTO: 7.03 X10 (3) UL (ref 1.5–7.7)
NEUTROPHILS # BLD AUTO: 7.03 X10(3) UL (ref 1.5–7.7)
NEUTROPHILS NFR BLD AUTO: 71.5 %
NONHDLC SERPL-MCNC: 103 MG/DL (ref ?–130)
OSMOLALITY SERPL CALC.SUM OF ELEC: 287 MOSM/KG (ref 275–295)
PLATELET # BLD AUTO: 287 10(3)UL (ref 150–450)
POTASSIUM SERPL-SCNC: 4.3 MMOL/L (ref 3.5–5.1)
PROT SERPL-MCNC: 8.4 G/DL (ref 6.4–8.2)
RBC # BLD AUTO: 5.01 X10(6)UL
SODIUM SERPL-SCNC: 138 MMOL/L (ref 136–145)
TRIGL SERPL-MCNC: 104 MG/DL (ref 30–149)
TSI SER-ACNC: 1.33 MIU/ML (ref 0.36–3.74)
VLDLC SERPL CALC-MCNC: 16 MG/DL (ref 0–30)
WBC # BLD AUTO: 9.8 X10(3) UL (ref 4–11)

## 2023-11-28 PROCEDURE — 3074F SYST BP LT 130 MM HG: CPT | Performed by: FAMILY MEDICINE

## 2023-11-28 PROCEDURE — 87624 HPV HI-RISK TYP POOLED RSLT: CPT | Performed by: FAMILY MEDICINE

## 2023-11-28 PROCEDURE — 80061 LIPID PANEL: CPT

## 2023-11-28 PROCEDURE — 84443 ASSAY THYROID STIM HORMONE: CPT

## 2023-11-28 PROCEDURE — 85025 COMPLETE CBC W/AUTO DIFF WBC: CPT

## 2023-11-28 PROCEDURE — 88175 CYTOPATH C/V AUTO FLUID REDO: CPT | Performed by: FAMILY MEDICINE

## 2023-11-28 PROCEDURE — 99213 OFFICE O/P EST LOW 20 MIN: CPT | Performed by: FAMILY MEDICINE

## 2023-11-28 PROCEDURE — 99396 PREV VISIT EST AGE 40-64: CPT | Performed by: FAMILY MEDICINE

## 2023-11-28 PROCEDURE — 36415 COLL VENOUS BLD VENIPUNCTURE: CPT

## 2023-11-28 PROCEDURE — 3079F DIAST BP 80-89 MM HG: CPT | Performed by: FAMILY MEDICINE

## 2023-11-28 PROCEDURE — 3008F BODY MASS INDEX DOCD: CPT | Performed by: FAMILY MEDICINE

## 2023-11-28 PROCEDURE — 80053 COMPREHEN METABOLIC PANEL: CPT

## 2023-11-29 LAB — HPV I/H RISK 1 DNA SPEC QL NAA+PROBE: NEGATIVE

## 2023-12-01 LAB
.: NORMAL
.: NORMAL

## 2023-12-07 ENCOUNTER — PATIENT MESSAGE (OUTPATIENT)
Dept: FAMILY MEDICINE CLINIC | Facility: CLINIC | Age: 43
End: 2023-12-07

## 2023-12-07 DIAGNOSIS — E11.9 TYPE 2 DIABETES MELLITUS WITHOUT COMPLICATION, WITHOUT LONG-TERM CURRENT USE OF INSULIN (HCC): ICD-10-CM

## 2023-12-08 RX ORDER — DULAGLUTIDE 1.5 MG/.5ML
1 INJECTION, SOLUTION SUBCUTANEOUS WEEKLY
Qty: 4 ML | Refills: 0 | Status: SHIPPED | OUTPATIENT
Start: 2023-12-08

## 2023-12-08 NOTE — TELEPHONE ENCOUNTER
3. Type 2 diabetes mellitus without complication, without long-term current use of insulin (HCC)  Continue trulicity, Last D2Z value was 5.7% done 11/7/2023. Please advise if ok to send trulicity.  Has been prescribed by weight loss clinic           Medication Quantity Refills Start End   TRULICITY 1.5 NS/1.4BE Subcutaneous Solution Pen-injector (Discontinued) 4 mL 0 11/6/2023 11/7/2023   Sig:   INJECT 1 SYRINGE SUBCUTANEOUSLY ONCE A WEEK     Route:   Subcutaneous     CEFERINO:   Yes     Reason for Discontinue:   Patient discontinued     Order #:   [id number]

## 2024-01-02 DIAGNOSIS — E11.9 TYPE 2 DIABETES MELLITUS WITHOUT COMPLICATION, WITHOUT LONG-TERM CURRENT USE OF INSULIN (HCC): ICD-10-CM

## 2024-01-02 RX ORDER — DULAGLUTIDE 1.5 MG/.5ML
1 INJECTION, SOLUTION SUBCUTANEOUS
Qty: 6 ML | Refills: 1 | Status: SHIPPED | OUTPATIENT
Start: 2024-01-02

## 2024-01-02 NOTE — TELEPHONE ENCOUNTER
Requested Prescriptions     Pending Prescriptions Disp Refills    TRULICITY 1.5 MG/0.5ML Subcutaneous Solution Pen-injector [Pharmacy Med Name: Trulicity 1.5 MG/0.5ML Subcutaneous Solution Pen-injector] 4 mL 0     Sig: INJECT 1 SYRINGE SUBCUTANEOUSLY ONCE A WEEK     LOV: 11/28/23  RTC:   Last Relevant Labs: 11/7/23  Filled: 12/8/23 # 4mL with 0 refills    No future appointments.

## 2024-02-13 DIAGNOSIS — I10 BENIGN ESSENTIAL HYPERTENSION: ICD-10-CM

## 2024-02-13 NOTE — TELEPHONE ENCOUNTER
Requested Prescriptions     Pending Prescriptions Disp Refills    OLMESARTAN 20 MG Oral Tab [Pharmacy Med Name: Olmesartan Medoxomil 20 MG Oral Tablet] 90 tablet 0     Sig: Take 1 tablet by mouth once daily       LOV: 11/28/23  RTC:   Last Relevant Labs: 11/28/23  Filled: 11/10/23 #90 with 0 refills    No future appointments.

## 2024-02-15 DIAGNOSIS — I10 BENIGN ESSENTIAL HYPERTENSION: ICD-10-CM

## 2024-02-15 RX ORDER — OLMESARTAN MEDOXOMIL 20 MG/1
20 TABLET ORAL DAILY
Qty: 90 TABLET | Refills: 1 | Status: SHIPPED | OUTPATIENT
Start: 2024-02-15

## 2024-02-16 RX ORDER — OLMESARTAN MEDOXOMIL 20 MG/1
20 TABLET ORAL DAILY
Qty: 90 TABLET | Refills: 0 | OUTPATIENT
Start: 2024-02-16

## 2024-02-19 ENCOUNTER — TELEPHONE (OUTPATIENT)
Dept: FAMILY MEDICINE CLINIC | Facility: CLINIC | Age: 44
End: 2024-02-19

## 2024-02-19 NOTE — TELEPHONE ENCOUNTER
- Rec'd incoming prior authorzation request via fax.    Key: [id number]  From [facility]            olmesartan 20 MG Oral Tab 90 tablet 1 2/15/2024 --    Sig - Route: Take 1 tablet by mouth once daily - Oral    Sent to pharmacy as: Olmesartan Medoxomil 20 MG Oral Tablet (Benicar)    E-Prescribing Status: Receipt confirmed by pharmacy (2/15/2024  2:43 PM CST)    No prior authorization was found for this prescription.    Found prior authorization for another prescription for the same medication: Canceled - Other (Medication was already dispensed to patient on 11/13/23 per the [place].)      Placed fax in MA folder.

## 2024-02-22 ENCOUNTER — TELEPHONE (OUTPATIENT)
Dept: FAMILY MEDICINE CLINIC | Facility: CLINIC | Age: 44
End: 2024-02-22

## 2024-02-22 NOTE — TELEPHONE ENCOUNTER
Recd this appointment notification via Silver Push:  3/14/2024    1:45 PM  30 mins.  Linda Madrigal           [facility] [address]      Patient Comments:   Having a lot of pain when going pee a   burning sensation and some blood with left lower back pain     No appts with PCP tomorrow, NP not in office. Please advise.

## 2024-02-23 ENCOUNTER — OFFICE VISIT (OUTPATIENT)
Dept: FAMILY MEDICINE CLINIC | Facility: CLINIC | Age: 44
End: 2024-02-23
Payer: MEDICAID

## 2024-02-23 ENCOUNTER — PATIENT MESSAGE (OUTPATIENT)
Dept: FAMILY MEDICINE CLINIC | Facility: CLINIC | Age: 44
End: 2024-02-23

## 2024-02-23 VITALS
WEIGHT: 249 LBS | HEIGHT: 66 IN | BODY MASS INDEX: 40.02 KG/M2 | DIASTOLIC BLOOD PRESSURE: 70 MMHG | RESPIRATION RATE: 16 BRPM | SYSTOLIC BLOOD PRESSURE: 110 MMHG | HEART RATE: 76 BPM | TEMPERATURE: 98 F

## 2024-02-23 DIAGNOSIS — E28.319 EARLY MENOPAUSE: ICD-10-CM

## 2024-02-23 DIAGNOSIS — E11.9 TYPE 2 DIABETES MELLITUS WITHOUT COMPLICATION, WITHOUT LONG-TERM CURRENT USE OF INSULIN (HCC): ICD-10-CM

## 2024-02-23 DIAGNOSIS — E28.319 EARLY MENOPAUSE: Primary | ICD-10-CM

## 2024-02-23 DIAGNOSIS — F41.9 ANXIETY: ICD-10-CM

## 2024-02-23 DIAGNOSIS — R30.0 DYSURIA: Primary | ICD-10-CM

## 2024-02-23 LAB
APPEARANCE: CLEAR
BILIRUBIN: NEGATIVE
GLUCOSE (URINE DIPSTICK): NEGATIVE MG/DL
KETONES (URINE DIPSTICK): NEGATIVE MG/DL
MULTISTIX EXPIRATION DATE: ABNORMAL DATE
NITRITE, URINE: NEGATIVE
PH, URINE: 5 (ref 4.5–8)
PROTEIN (URINE DIPSTICK): NEGATIVE MG/DL
SPECIFIC GRAVITY: 1.15 (ref 1–1.03)
UROBILINOGEN,SEMI-QN: 0.2 MG/DL (ref 0–1.9)

## 2024-02-23 PROCEDURE — 87086 URINE CULTURE/COLONY COUNT: CPT | Performed by: FAMILY MEDICINE

## 2024-02-23 RX ORDER — NITROFURANTOIN 25; 75 MG/1; MG/1
100 CAPSULE ORAL 2 TIMES DAILY
Qty: 14 CAPSULE | Refills: 0 | Status: SHIPPED | OUTPATIENT
Start: 2024-02-23 | End: 2024-03-01

## 2024-02-23 RX ORDER — BUSPIRONE HYDROCHLORIDE 10 MG/1
10 TABLET ORAL 3 TIMES DAILY
Qty: 90 TABLET | Refills: 0 | Status: SHIPPED | OUTPATIENT
Start: 2024-02-23 | End: 2024-03-24

## 2024-02-23 NOTE — TELEPHONE ENCOUNTER
From: Carmina Ji  To: Linda Madrigal  Sent: 2/23/2024 10:17 AM CST  Subject: Ophthalmology     I called to schedule an appointment they said they already met their quota for this insurance and they cannot give me an appointment. I don’t know what else to do or who to call. Call insurance company and the people they refer also say they don’t take my insurance.

## 2024-02-23 NOTE — PROGRESS NOTES
Carmina Ji is a 43 year old female.    HPI:   Patient presents with symptoms of UTI. Onset of symptoms 5 days ago. Complaining of urinary frequency, urgency, dysuria, suprapubic pain, back pain, hematuria. Denies back pain, fever, hematuria.  Denies nausea or vomiting .  Denies hx of pyelonephritis.   Hx of kidney stone x 1.   Denies hx of recurrent UTI.     She no longer has periods since age 31. She never saw GYN for this.     Needs DM eye exam referral.     Patient complains of anxiety recently since she no longer has a job. Reports excess stress, panic, heart racing. She used to take buspar- well tolerated and wants ot get back on this medication.     Lab Results   Component Value Date    SPECGRAVITY 1.15 02/23/2024    PHURINE 5.0 02/23/2024    NITRITE Negative 02/23/2024     Leukocytes small and + blood.     Current Outpatient Medications   Medication Sig Dispense Refill    nitrofurantoin monohydrate macro 100 MG Oral Cap Take 1 capsule (100 mg total) by mouth 2 (two) times daily for 7 days. 14 capsule 0    busPIRone 10 MG Oral Tab Take 1 tablet (10 mg total) by mouth 3 (three) times daily. 90 tablet 0    olmesartan 20 MG Oral Tab Take 1 tablet by mouth once daily 90 tablet 1    TRULICITY 1.5 MG/0.5ML Subcutaneous Solution Pen-injector INJECT 1 SYRINGE SUBCUTANEOUSLY ONCE A WEEK 6 mL 1    Glucose Blood (ONETOUCH ULTRA) In Vitro Strip USE 1 STRIP TO CHECK GLUCOSE ONCE DAILY 100 each 0    Glucose Blood In Vitro Strip Test blood sugar 2 times daily. Diagnosis: E11.9.      EPINEPHrine (EPIPEN) 0.3 MG/0.3ML Injection Device Inject as directed for angioedema reaction with symptoms 1 Device 0      Past Medical History:   Diagnosis Date    Abnormal glucose     Anxiety 2004    Benign essential hypertension 07/25/2017    Calculus of kidney     Depression 2004    Diabetes ([facility]) 2018    Hypertension 05/23/2013    Obesity 05/23/2013    Type 2 diabetes mellitus ([facility]) 12/22/2019    Unspecified essential hypertension        Social History:  Smoking, denies     REVIEW OF SYSTEMS:   GENERAL HEALTH: feels well otherwise  SKIN: denies any unusual skin lesions or rashes  RESPIRATORY: no shortness of breath with exertion  CARDIOVASCULAR: denies chest pain on exertion  GI: no nausea or vomiting  NEURO: denies headaches       EXAM:   /70   Pulse 76   Temp 98 °F (36.7 °C) (Temporal)   Resp 16   Ht 5' 6\" (1.676 m)   Wt 249 lb (112.9 kg)   LMP  (LMP Unknown)   BMI 40.19 kg/m²   GENERAL: well developed, well nourished,in no apparent distress  SKIN: no rashes,no suspicious lesions  GI: good BS's,no masses, HSM; suprapubic tenderness, left CVA mildly tender. No R cva tenderness.   Normal mood and affect  ASSESSMENT AND PLAN:   UTI. Plan is to start on macrobid.   Culture ordered.   Discussed s/s of pyelonephritis and to go to ED if this occurs.   Instructions given on increasing fluid intake, bladder emptying after intercourse.   The patient indicates understanding of these issues and agrees to the plan.  The patient is asked to return in 3 days if not better. Call if fever, vomiting, worsening symptoms.    1. Dysuria  - URINALYSIS, AUTO, W/O SCOPE  - Urine Culture, Routine [E]; Future  - Urine Culture, Routine [E]  - nitrofurantoin monohydrate macro 100 MG Oral Cap; Take 1 capsule (100 mg total) by mouth 2 (two) times daily for 7 days.  Dispense: 14 capsule; Refill: 0    2. Type 2 diabetes mellitus without complication, without long-term current use of insulin ([facility])  Referred to Ophtho   - Ophthalmology Referral - In Network    3. Early menopause  Amenorrhea  since age 31- referred ot GYN for suspected early menopause.   - OBG - INTERNAL    4. Anxiety  Started on buspar  F/u 1 month  Discussed medication dosage, usage, side effects, and goals of treatment in detail.  Instructed patient to read medication insert for all side effects and contraindications before starting medication.   Instructed patient to review risks of medications  and interactions with other medications with the pharmacist.       Linda Madrigal,         This note was prepared using Dragon Medical voice recognition dictation software. As a result errors may occur. When identified these errors have been corrected. While every attempt is made to correct errors during dictation discrepancies may still exist.      Note to patient: The 21st Century Cures Act makes medical notes like these available to patients in the interest of transparency. However, be advised this is a medical document. It is intended as peer to peer communication. It is written in medical language and may contain abbreviations or verbiage that are unfamiliar. It may appear blunt or direct. Medical documents are intended to carry relevant information, facts as evident, and the clinical opinion of the practitioner.            ID#[id number]

## 2024-02-28 ENCOUNTER — HOSPITAL ENCOUNTER (OUTPATIENT)
Dept: MAMMOGRAPHY | Facility: HOSPITAL | Age: 44
Discharge: HOME | End: 2024-02-28
Attending: FAMILY MEDICINE
Payer: MEDICAID

## 2024-02-28 DIAGNOSIS — N64.52 BREAST DISCHARGE: ICD-10-CM

## 2024-02-28 DIAGNOSIS — N64.4 BREAST PAIN: ICD-10-CM

## 2024-02-28 PROCEDURE — 77066 DX MAMMO INCL CAD BI: CPT | Performed by: FAMILY MEDICINE

## 2024-02-28 PROCEDURE — 76642 ULTRASOUND BREAST LIMITED: CPT | Performed by: FAMILY MEDICINE

## 2024-02-28 PROCEDURE — 77062 BREAST TOMOSYNTHESIS BI: CPT | Performed by: FAMILY MEDICINE

## 2024-03-26 ENCOUNTER — PATIENT MESSAGE (OUTPATIENT)
Dept: FAMILY MEDICINE CLINIC | Facility: CLINIC | Age: 44
End: 2024-03-26

## 2024-03-26 DIAGNOSIS — E11.9 TYPE 2 DIABETES MELLITUS WITHOUT COMPLICATION, WITHOUT LONG-TERM CURRENT USE OF INSULIN (HCC): ICD-10-CM

## 2024-03-27 NOTE — TELEPHONE ENCOUNTER
From: Carmina Ji  To: Linda Madrigal  Sent: 3/26/2024 7:50 PM CDT  Subject: Medication    Hello,    I wanted to ask I’m currently on trulicity 1.5 mg for my diabetes but the cost is 1,200 a month and I currently lost my health insurance. Is there any other medication that you can prescribe me that the cost out of pocket isn’t so expensive. I’ve been off of diabetes medication for 2 weeks now and I really need something to help me with my blood sugar I would greatly appreciate it.     Thank you,  Carmina

## 2024-03-27 NOTE — TELEPHONE ENCOUNTER
We can do glipizide or metformin. I believe she had been on these before. Is there a medication that she prefers based on hx of side effects ?

## 2024-03-29 NOTE — TELEPHONE ENCOUNTER
The third option is Actos. It has SE such s weight gain, fluid retention, , increased thirst nausea.     Some serious adverse effects include:  heart failure, bladder CA, bone fractures

## 2024-04-01 RX ORDER — METFORMIN HYDROCHLORIDE 500 MG/1
500 TABLET, EXTENDED RELEASE ORAL
Qty: 90 TABLET | Refills: 0 | Status: SHIPPED | OUTPATIENT
Start: 2024-04-01 | End: 2024-06-30

## 2024-04-01 NOTE — TELEPHONE ENCOUNTER
metFORMIN  MG Oral Tablet 24 Hr ()  Take 1 tablet (500 mg total) by mouth daily with breakfast. Dispense: 90 tablet, Refills: 0 ordered   2022 -- -- Report        Dose, Route, Frequency: 500 mg, Oral, Daily with breakfastDx Associated: Long-term: Different Encounter: Ordered Date & Time: 2022 0000Start Date & Time: 2022End w/ Doses: 2022 0 doses givenPharmacy: [facility] [address] [place] [address] (N), [place] - [address] [place] [address] [address] [phone], [phone]:            Ok to reorder?

## 2024-06-26 DIAGNOSIS — E11.9 TYPE 2 DIABETES MELLITUS WITHOUT COMPLICATION, WITHOUT LONG-TERM CURRENT USE OF INSULIN (HCC): ICD-10-CM

## 2024-07-01 NOTE — TELEPHONE ENCOUNTER
Requesting             Disp Refills Start End     metFORMIN  MG Oral Tablet 24 Hr 90 tablet 0 4/1/2024 6/30/2024    Sig - Route: Take 1 tablet (500 mg total) by mouth daily with breakfast. - Oral    Sent to pharmacy as: metFORMIN HCl  MG Oral Tablet Extended Release 24 Hour (Glucophage XR)        LOV: 2/23/2024 for dysuria  RTC: Return in about 4 weeks (around 3/22/2024) for medication refills.     Last Relevant Labs: 1/2/2023 and 11/28/2023    Filled:     Dispensed Written Strength Quantity Refills Days Supply Provider Pharmacy    METFORMIN ER 500MG  TAB 04/02/2024 04/01/2024  90 each  90 Linda Madrigal DO [facility] [id number] ...       No future appointments    Please call to schedule overdue 4 week follow up for med refills

## 2024-07-03 RX ORDER — METFORMIN HYDROCHLORIDE 500 MG/1
500 TABLET, EXTENDED RELEASE ORAL
Qty: 90 TABLET | Refills: 0 | OUTPATIENT
Start: 2024-07-03

## 2025-02-01 ENCOUNTER — PATIENT MESSAGE (OUTPATIENT)
Dept: FAMILY MEDICINE CLINIC | Facility: CLINIC | Age: 45
End: 2025-02-01

## 2025-02-01 DIAGNOSIS — I10 BENIGN ESSENTIAL HYPERTENSION: ICD-10-CM

## 2025-02-01 RX ORDER — OLMESARTAN MEDOXOMIL 20 MG/1
20 TABLET ORAL DAILY
Qty: 90 TABLET | Refills: 1 | Status: SHIPPED | OUTPATIENT
Start: 2025-02-01

## 2025-02-20 ENCOUNTER — OFFICE VISIT (OUTPATIENT)
Dept: FAMILY MEDICINE CLINIC | Facility: CLINIC | Age: 45
End: 2025-02-20
Payer: COMMERCIAL

## 2025-02-20 VITALS
BODY MASS INDEX: 42.27 KG/M2 | HEIGHT: 66 IN | WEIGHT: 263 LBS | DIASTOLIC BLOOD PRESSURE: 78 MMHG | TEMPERATURE: 98 F | RESPIRATION RATE: 16 BRPM | SYSTOLIC BLOOD PRESSURE: 118 MMHG

## 2025-02-20 DIAGNOSIS — E11.9 TYPE 2 DIABETES MELLITUS WITHOUT COMPLICATION, WITHOUT LONG-TERM CURRENT USE OF INSULIN (HCC): ICD-10-CM

## 2025-02-20 DIAGNOSIS — N30.01 ACUTE CYSTITIS WITH HEMATURIA: Primary | ICD-10-CM

## 2025-02-20 DIAGNOSIS — N39.0 RECURRENT UTI: ICD-10-CM

## 2025-02-20 LAB
CREAT UR-SCNC: 197.9 MG/DL
GLUCOSE (URINE DIPSTICK): NEGATIVE MG/DL
KETONES (URINE DIPSTICK): NEGATIVE MG/DL
LEUKOCYTES: NEGATIVE
MICROALBUMIN UR-MCNC: 2.5 MG/DL
MICROALBUMIN/CREAT 24H UR-RTO: 12.6 UG/MG (ref ?–30)
MULTISTIX LOT#: ABNORMAL NUMERIC
NITRITE, URINE: NEGATIVE
PH, URINE: 5.5 (ref 4.5–8)
SPECIFIC GRAVITY: 1.03 (ref 1–1.03)
URINE-COLOR: YELLOW
UROBILINOGEN,SEMI-QN: 1 MG/DL (ref 0–1.9)

## 2025-02-20 PROCEDURE — 3074F SYST BP LT 130 MM HG: CPT | Performed by: FAMILY MEDICINE

## 2025-02-20 PROCEDURE — 87086 URINE CULTURE/COLONY COUNT: CPT | Performed by: FAMILY MEDICINE

## 2025-02-20 PROCEDURE — 3008F BODY MASS INDEX DOCD: CPT | Performed by: FAMILY MEDICINE

## 2025-02-20 PROCEDURE — G2211 COMPLEX E/M VISIT ADD ON: HCPCS | Performed by: FAMILY MEDICINE

## 2025-02-20 PROCEDURE — 99213 OFFICE O/P EST LOW 20 MIN: CPT | Performed by: FAMILY MEDICINE

## 2025-02-20 PROCEDURE — 81003 URINALYSIS AUTO W/O SCOPE: CPT | Performed by: FAMILY MEDICINE

## 2025-02-20 PROCEDURE — 3078F DIAST BP <80 MM HG: CPT | Performed by: FAMILY MEDICINE

## 2025-02-20 PROCEDURE — 82570 ASSAY OF URINE CREATININE: CPT | Performed by: FAMILY MEDICINE

## 2025-02-20 PROCEDURE — 82043 UR ALBUMIN QUANTITATIVE: CPT | Performed by: FAMILY MEDICINE

## 2025-02-20 RX ORDER — METFORMIN HYDROCHLORIDE 500 MG/1
500 TABLET, EXTENDED RELEASE ORAL
COMMUNITY
Start: 2024-09-22 | End: 2025-02-20

## 2025-02-20 RX ORDER — METFORMIN HYDROCHLORIDE 500 MG/1
500 TABLET, EXTENDED RELEASE ORAL
Qty: 90 TABLET | Refills: 1 | Status: SHIPPED | OUTPATIENT
Start: 2025-02-20

## 2025-02-20 RX ORDER — SULFAMETHOXAZOLE AND TRIMETHOPRIM 800; 160 MG/1; MG/1
1 TABLET ORAL 2 TIMES DAILY
Qty: 20 TABLET | Refills: 0 | Status: SHIPPED | OUTPATIENT
Start: 2025-02-20 | End: 2025-03-02

## 2025-02-20 NOTE — PATIENT INSTRUCTIONS
Thank you for choosing Mj Irby MD at [facility]  To Do: Carmina Ji  1. Please see below   Call [phone] to schedule the appointment.   Please signup for EventSorbet, which is electronic access to your record if you have not done so.  All your results will post on there.  [email or web address]   You can NOW use EventSorbet to book your appointments with us, or consider using open access scheduling which is through the [place] website [email or web address] and type in Mj Irby MD and follow the links for \"Schedule Online Now\"    To schedule Imaging or tests at [facility] call Central Scheduling [phone], Go to Critical access hospital A ER Building (For example: CT scans, X rays, Ultrasound, MRI)  Cardiac Testing in ER building Building A second floor Cardiac Testing [phone] (For example: Holter Monitor, Cardiac Stress tests,Event Monitor, or 2D Echocardiograms)  [facility] call [phone] usually in Critical access hospital A  Walk in Clinic in [place] at [address] Mon-Fri at 8am-7:30 p.m., and Sat/Sun 9:00a.m.-4:30 p.m.  Also at [address] [place] [address]  Call [phone] for info     Please call our office about any questions regarding your treatment/medicines/tests as a result of today's visit.  For your safety, read the entire package insert of all medicines prescribed to you and be aware of all of the risks of treatment even beyond those discussed today.  All therapies have potential risk of harm or side effects or medication interactions.  It is your duty and for your safety to discuss with the pharmacist and our office with questions, and to notify us and stop treatment if problems arise, but know that our intention is that the benefits outweigh those potential risks and we strive to make you healthier and to improve your quality of life.    Referrals, and Radiology Information:    If your insurance requires a referral to a specialist, please allow 5 business days to process your  referral request.    If Mj rIby MD orders a CT or MRI, it may take up to 10 business days to receive approval from your insurance company. Once our office has called informing you that the insurance company approved your testing, please call Central Scheduling at [phone]  Please allow our office 5 business days to contact you regarding any testing results.    Refill policies:   Allow 3 business days for refills; controlled substances may take longer and must be picked up from the office in person.  Narcotic medications can only be filled in 30 day increments and must be refilled at an office visit only.  If your prescription is due for a refill, you may be due for a follow-up appointment.  We cannot refill your maintenance medications at a preventative wellness visit.  To best provide you care, patients receiving maintenance medications need to be seen at least twice a year.

## 2025-02-20 NOTE — PROGRESS NOTES
Subjective:   Patient ID: Carmina Ji is a 44 year old female.    HPI  Ms. Ji is a very pleasant 44-year-old female with history of diabetes mellitus, hypertension, history of UTI presenting today for burning when she urinates for the past 3 days associated with urinary frequency, hesitancy sense of incomplete bladder emptying and left-sided back pain.  She noticed also some blood in the urine.  No fever no nausea no vomiting no constipation or diarrhea.    I had reviewed past medical and family histories together with allergy and medication lists documented.        History/Other:   Review of Systems   Constitutional:  Negative for fatigue and fever.   Respiratory:  Negative for cough and shortness of breath.    Gastrointestinal:  Negative for abdominal pain, diarrhea, nausea and vomiting.   Genitourinary:  Positive for difficulty urinating, dysuria, hematuria and urgency.   Musculoskeletal:  Positive for back pain.     Current Outpatient Medications   Medication Sig Dispense Refill    Estradiol 10 % Does not apply Cream       PROGESTERONE OR Take by mouth. cream      metFORMIN  MG Oral Tablet 24 Hr Take 1 tablet (500 mg total) by mouth daily with breakfast. 90 tablet 1    sulfamethoxazole-trimethoprim -160 MG Oral Tab per tablet Take 1 tablet by mouth 2 (two) times daily for 10 days. 20 tablet 0    olmesartan 20 MG Oral Tab Take 1 tablet (20 mg total) by mouth daily. 90 tablet 1    TRULICITY 1.5 MG/0.5ML Subcutaneous Solution Pen-injector INJECT 1 SYRINGE SUBCUTANEOUSLY ONCE A WEEK (Patient not taking: Reported on 2/20/2025) 6 mL 1    Glucose Blood (ONETOUCH ULTRA) In Vitro Strip USE 1 STRIP TO CHECK GLUCOSE ONCE DAILY 100 each 0    Glucose Blood In Vitro Strip Test blood sugar 2 times daily. Diagnosis: E11.9.      EPINEPHrine (EPIPEN) 0.3 MG/0.3ML Injection Device Inject as directed for angioedema reaction with symptoms 1 Device 0     Allergies:Allergies[1]    Objective:   Physical Exam  Vitals  reviewed.   Constitutional:       General: She is not in acute distress.  HENT:      Mouth/Throat:      Mouth: Mucous membranes are moist.      Pharynx: Oropharynx is clear.   Eyes:      General: No scleral icterus.     Conjunctiva/sclera: Conjunctivae normal.   Cardiovascular:      Rate and Rhythm: Normal rate and regular rhythm.      Heart sounds: Normal heart sounds. No murmur heard.  Pulmonary:      Effort: Pulmonary effort is normal. No respiratory distress.      Breath sounds: Normal breath sounds. No wheezing or rales.   Abdominal:      General: Bowel sounds are normal. There is no distension.      Palpations: Abdomen is soft. There is no mass.      Tenderness: There is no abdominal tenderness. There is no right CVA tenderness or left CVA tenderness.   Musculoskeletal:      Cervical back: Neck supple.      Right lower leg: No edema.      Left lower leg: No edema.   Lymphadenopathy:      Cervical: No cervical adenopathy.   Skin:     General: Skin is warm.   Neurological:      Mental Status: She is alert.   Psychiatric:         Mood and Affect: Mood normal.         Behavior: Behavior normal.         Assessment & Plan:   1. Acute cystitis with hematuria   -UA dip done in the office which shows large amount of blood  - Negative for nitrites and bacteria  - We will treat with Bactrim DS 1 tablet twice daily for 10 days  - Keep hydrated  - May take Tylenol or ibuprofen as needed for fever pain  - Will submit sample for culture     2. Type 2 diabetes mellitus without complication, without long-term current use of insulin (HCC)   -Stable  - Continue metformin  - Will check routine labs and will notify her once we get test results  - Follow-up after 3 months   3. Recurrent UTI   -Explained to her that possibly due to underlying diabetes  - However at this point I will refer her to urology to be evaluated further     This note was prepared using Dragon Medical voice recognition dictation software. As a result errors  may occur. When identified these errors have been corrected. While every attempt is made to correct errors during dictation discrepancies may still exist          Orders Placed This Encounter   Procedures    CBC With Differential With Platelet    Comp Metabolic Panel (14)    TSH and Free T4    Lipid Panel    Hemoglobin A1C [E]    Microalb/Creat Ratio, Random Urine [E]    Urine Culture, Routine [E]       Meds This Visit:  Requested Prescriptions     Signed Prescriptions Disp Refills    metFORMIN  MG Oral Tablet 24 Hr 90 tablet 1     Sig: Take 1 tablet (500 mg total) by mouth daily with breakfast.    sulfamethoxazole-trimethoprim -160 MG Oral Tab per tablet 20 tablet 0     Sig: Take 1 tablet by mouth 2 (two) times daily for 10 days.       Imaging & Referrals:  UROLOGY - INTERNAL         [1]   Allergies  Allergen Reactions    Ace Inhibitors ANGIOEDEMA, HIVES and OTHER (SEE COMMENTS)     Angioedema  angioedema      Lisinopril      FACIAL SWELLING

## 2025-02-25 NOTE — PROGRESS NOTES
[facility] Urology  Initial Office Consultation    HPI:   Carmina Ji is a 44 year old female with PMHx of anxiety, HTN, kidney stone, depression, and DM here today for lower urinary tract symptoms.     Patient complains of lower urinary tract symptoms. She was seen on 25 with c/o UTI symptoms. Her urine culture was negative, however her urine dip showed large blood. No microscopy was performed, but the patient states she was seeing blood in the toilet that day, as well as having left flank pain. No imaging was performed. The patient endorses a history of kidney stones approximately 10 years ago.    States she was experiencing gross hematuria. Quit smoking 10 years ago, only socially at that point. No chemical exposure. Denies unexplained weight loss and bone pain. No family history of  malignancy.      Past Medical History:    Abnormal glucose    Anxiety    Benign essential hypertension    Calculus of kidney    Depression    Diabetes (HCC)    Hypertension    Obesity    Type 2 diabetes mellitus (HCC)    Unspecified essential hypertension     Past Surgical History:   Procedure Laterality Date           Family History   Problem Relation Age of Onset    Hypertension Mother     Cancer Mother     Hypertension Father     Diabetes Father     Heart Disorder Maternal Grandfather     Stroke Maternal Grandfather     Diabetes Paternal Grandmother     Diabetes Paternal Grandfather     Breast Cancer Maternal Aunt 48     Social History     Socioeconomic History    Marital status:    Tobacco Use    Smoking status: Never    Smokeless tobacco: Never   Vaping Use    Vaping status: Never Used   Substance and Sexual Activity    Alcohol use: Not Currently    Drug use: No   Other Topics Concern    Caffeine Concern No    Exercise No    Seat Belt No    Special Diet No    Stress Concern No    Weight Concern No     Current Outpatient Medications   Medication Sig Dispense Refill    Estradiol 10 % Does not apply Cream        PROGESTERONE OR Take by mouth. cream      metFORMIN  MG Oral Tablet 24 Hr Take 1 tablet (500 mg total) by mouth daily with breakfast. 90 tablet 1    sulfamethoxazole-trimethoprim -160 MG Oral Tab per tablet Take 1 tablet by mouth 2 (two) times daily for 10 days. 20 tablet 0    olmesartan 20 MG Oral Tab Take 1 tablet (20 mg total) by mouth daily. 90 tablet 1    TRULICITY 1.5 MG/0.5ML Subcutaneous Solution Pen-injector INJECT 1 SYRINGE SUBCUTANEOUSLY ONCE A WEEK (Patient not taking: Reported on 2/20/2025) 6 mL 1    Glucose Blood (ONETOUCH ULTRA) In Vitro Strip USE 1 STRIP TO CHECK GLUCOSE ONCE DAILY 100 each 0    Glucose Blood In Vitro Strip Test blood sugar 2 times daily. Diagnosis: E11.9.      EPINEPHrine (EPIPEN) 0.3 MG/0.3ML Injection Device Inject as directed for angioedema reaction with symptoms 1 Device 0       Allergies: Ace inhibitors and Lisinopril    REVIEW OF SYSTEMS:  Review of Systems   All other systems reviewed and are negative.        EXAM:  [facility] 02/20/2025     Physical Exam  Constitutional:       Appearance: Normal appearance.   HENT:      Head: Normocephalic and atraumatic.      Mouth/Throat:      Mouth: Mucous membranes are moist.   Pulmonary:      Effort: Pulmonary effort is normal.   Abdominal:      General: Abdomen is flat.      Palpations: Abdomen is soft.   Skin:     General: Skin is warm and dry.   Neurological:      Mental Status: She is alert and oriented to person, place, and time.   Psychiatric:         Mood and Affect: Mood normal.         Thought Content: Thought content normal.          LABS:  No results found for: \"PSA\", \"QPSA\", \"TOTPSASCREEN\"  Lab Results   Component Value Date    WBC 9.8 11/28/2023    RBC 5.01 11/28/2023    HGB 14.8 11/28/2023    HCT 44.7 11/28/2023    MCV 89.2 11/28/2023    MCH 29.5 11/28/2023    MCHC 33.1 11/28/2023    RDW 12.9 11/28/2023    .0 11/28/2023    MPV 11.9 02/01/2013     Lab Results   Component Value Date     (H) 11/28/2023     BUN 14 11/28/2023    CREATSERUM 0.74 11/28/2023    ANIONGAP 4 11/28/2023    GFRNAA > 90 02/01/2013    GFRAA > 90 02/01/2013    CA 9.1 11/28/2023     11/28/2023    K 4.3 11/28/2023     11/28/2023    CO2 29.0 11/28/2023     No results found for: \"PTP\", \"PT\", \"INR\"    IMAGING:  No results found.    IMPRESSION:  Lower Urinary Tract Symptoms  Gross Hematuria    PLAN:  - Conservative management  - CT Urogram, office cystoscopy, and urine for cytology    Kendra Pillai, APRN  2/27/2025

## 2025-02-27 ENCOUNTER — OFFICE VISIT (OUTPATIENT)
Dept: SURGERY | Facility: CLINIC | Age: 45
End: 2025-02-27
Payer: COMMERCIAL

## 2025-02-27 ENCOUNTER — TELEPHONE (OUTPATIENT)
Dept: SURGERY | Facility: CLINIC | Age: 45
End: 2025-02-27

## 2025-02-27 VITALS
SYSTOLIC BLOOD PRESSURE: 125 MMHG | DIASTOLIC BLOOD PRESSURE: 87 MMHG | WEIGHT: 254 LBS | HEIGHT: 65 IN | HEART RATE: 78 BPM | BODY MASS INDEX: 42.32 KG/M2

## 2025-02-27 DIAGNOSIS — R39.9 LOWER URINARY TRACT SYMPTOMS: ICD-10-CM

## 2025-02-27 DIAGNOSIS — R31.0 GROSS HEMATURIA: Primary | ICD-10-CM

## 2025-02-27 PROCEDURE — 3008F BODY MASS INDEX DOCD: CPT

## 2025-02-27 PROCEDURE — 3079F DIAST BP 80-89 MM HG: CPT

## 2025-02-27 PROCEDURE — 3074F SYST BP LT 130 MM HG: CPT

## 2025-02-27 PROCEDURE — 99204 OFFICE O/P NEW MOD 45 MIN: CPT

## 2025-02-28 LAB — NON GYNE INTERPRETATION: NEGATIVE

## 2025-03-01 ENCOUNTER — LAB ENCOUNTER (OUTPATIENT)
Dept: LAB | Age: 45
End: 2025-03-01
Attending: FAMILY MEDICINE
Payer: COMMERCIAL

## 2025-03-01 DIAGNOSIS — E11.9 TYPE 2 DIABETES MELLITUS WITHOUT COMPLICATION, WITHOUT LONG-TERM CURRENT USE OF INSULIN (HCC): ICD-10-CM

## 2025-03-03 ENCOUNTER — PATIENT MESSAGE (OUTPATIENT)
Dept: FAMILY MEDICINE CLINIC | Facility: CLINIC | Age: 45
End: 2025-03-03

## 2025-03-04 ENCOUNTER — TELEPHONE (OUTPATIENT)
Dept: FAMILY MEDICINE CLINIC | Facility: CLINIC | Age: 45
End: 2025-03-04

## 2025-03-04 RX ORDER — TIRZEPATIDE 2.5 MG/.5ML
2.5 INJECTION, SOLUTION SUBCUTANEOUS WEEKLY
Qty: 2 ML | Refills: 0 | Status: SHIPPED | OUTPATIENT
Start: 2025-03-04

## 2025-03-04 NOTE — TELEPHONE ENCOUNTER
May start on Mounjaro 2.5 mg every week for 4 weeks.  Make sure to follow-up after 4 weeks with Dr. Madrigal

## 2025-03-06 ENCOUNTER — HOSPITAL ENCOUNTER (OUTPATIENT)
Dept: CT IMAGING | Age: 45
Discharge: HOME | End: 2025-03-06
Payer: COMMERCIAL

## 2025-03-06 DIAGNOSIS — R31.0 GROSS HEMATURIA: ICD-10-CM

## 2025-03-06 DIAGNOSIS — R39.9 LOWER URINARY TRACT SYMPTOMS: ICD-10-CM

## 2025-03-06 PROCEDURE — 74178 CT ABD&PLV WO CNTR FLWD CNTR: CPT

## 2025-03-06 PROCEDURE — 76377 3D RENDER W/INTRP POSTPROCES: CPT

## 2025-03-09 ENCOUNTER — PATIENT MESSAGE (OUTPATIENT)
Dept: FAMILY MEDICINE CLINIC | Facility: CLINIC | Age: 45
End: 2025-03-09

## 2025-03-11 ENCOUNTER — PATIENT MESSAGE (OUTPATIENT)
Dept: FAMILY MEDICINE CLINIC | Facility: CLINIC | Age: 45
End: 2025-03-11

## 2025-03-13 ENCOUNTER — TELEPHONE (OUTPATIENT)
Dept: FAMILY MEDICINE CLINIC | Facility: CLINIC | Age: 45
End: 2025-03-13

## 2025-03-13 NOTE — TELEPHONE ENCOUNTER
See patient message from 3/11/2025 and 3/9/2025.    Flavia Cho MA -  3/13/25  8:34 AM  Note      PA submitted through [employer] awaiting determination.        
[facility] is telling the patient her Mounjaro is delayed from 3/4/25. The pharmacy is supposed to send a fax. Patient would like to know what the hold up is?   
None

## 2025-04-04 ENCOUNTER — PROCEDURE (OUTPATIENT)
Dept: SURGERY | Facility: CLINIC | Age: 45
End: 2025-04-04
Payer: COMMERCIAL

## 2025-04-04 VITALS — RESPIRATION RATE: 16 BRPM | DIASTOLIC BLOOD PRESSURE: 64 MMHG | SYSTOLIC BLOOD PRESSURE: 95 MMHG | HEART RATE: 86 BPM

## 2025-04-04 DIAGNOSIS — R31.0 GROSS HEMATURIA: Primary | ICD-10-CM

## 2025-04-04 PROCEDURE — 52000 CYSTOURETHROSCOPY: CPT | Performed by: UROLOGY

## 2025-04-04 NOTE — PROCEDURES
CYSTOSCOPY (FEMALE)    PRE-OP DIAGNOSIS: gross hematuria    POST-OP DIAGNOSIS: same    PROCEDURE: Cystsocopy    SURGEON: Katerin Mckoy MD    ASSISTANT: none     EBL: minimal    FINDINGS:   Urethra: No urethral lesions, no urethral strictures  Bladder: Bilateral ureteral orifices in orthotopic position with efflux, no suspicious or concerning erythematous lesions, no papillary bladder tumors, no stones   Retroflexion: no abnormalities   Other findings: none    INDICATIONS: gross hematuria    PROCEDURE: Patient was brought to the procedure suite and a timeout was performed identifying the patient,  and procedure being performed.  The risks of the procedure were once again detailed to the patient including bleeding, infection, dysuria.  The patient agreed to proceed.  The patient had a negative urinalysis.  No antibiotics were given to patient prior to this procedure.     She was placed in a supine position on the table and a flexible cystoscope was inserted per urethra.  There were no obvious urethral lesions or strictures. Once in the bladder we performed a full diagnostic/surveillance cystoscopy which demonstrated no abnormalities.  On retroflexion we noted no abnormalities.  The scope was then carefully removed and once again no urethral abnormalities were noted.    There were no complications after this procedure and the patient tolerated the procedure without issue.    IMPRESSION: cysto negative. CTU negative. Cytology negative.    PLAN:    Rtc prn

## 2025-04-05 ENCOUNTER — TELEMEDICINE (OUTPATIENT)
Dept: FAMILY MEDICINE CLINIC | Facility: CLINIC | Age: 45
End: 2025-04-05
Payer: COMMERCIAL

## 2025-04-05 VITALS — WEIGHT: 252 LBS | HEIGHT: 65 IN | BODY MASS INDEX: 41.99 KG/M2

## 2025-04-05 DIAGNOSIS — I10 PRIMARY HYPERTENSION: ICD-10-CM

## 2025-04-05 DIAGNOSIS — E11.9 TYPE 2 DIABETES MELLITUS WITHOUT COMPLICATION, WITHOUT LONG-TERM CURRENT USE OF INSULIN (HCC): Primary | ICD-10-CM

## 2025-04-05 DIAGNOSIS — Z12.31 SCREENING MAMMOGRAM FOR BREAST CANCER: ICD-10-CM

## 2025-04-05 PROCEDURE — 98005 SYNCH AUDIO-VIDEO EST LOW 20: CPT | Performed by: FAMILY MEDICINE

## 2025-04-05 RX ORDER — TIRZEPATIDE 5 MG/.5ML
5 INJECTION, SOLUTION SUBCUTANEOUS WEEKLY
Qty: 6 ML | Refills: 0 | Status: SHIPPED | OUTPATIENT
Start: 2025-04-05 | End: 2025-07-04

## 2025-04-05 RX ORDER — OLMESARTAN MEDOXOMIL 5 MG/1
10 TABLET ORAL DAILY
Qty: 360 TABLET | Refills: 0 | Status: SHIPPED | OUTPATIENT
Start: 2025-04-05 | End: 2025-10-02

## 2025-04-05 NOTE — PROGRESS NOTES
Subjective    This visit is conducted using Telemedicine with live, interactive video and audio.    Chief Complaint:      The patient confirmed knowledge of the limitations of the use of telemedicine were verbally confirmed by the provider.  Verification of patient identity was established.  Patient understands and accepts financial responsibility for any deductible, co-insurance and/or co-pays associated with this service.      HPI:  Diabetes   She has type 2 diabetes mellitus.   Last A1c value was 6.8% done 3/1/2025.   With metformin sugars were getting down to 70 and made her dizzy so she stopped taking it. She has been on mounjaro for about 3 weeks- 2.5mg weekly. She stopped metformin 2 weeks ago and the sugars are now around 100 and dizziness resolved.    Pertinent negatives for diabetes include no blurred vision, no chest pain, no fatigue, no foot ulcerations, no polydipsia and no polyuria. Risk factors for coronary artery disease include diabetes mellitus, hypertension and obesity.   Discussed r/b of statin therapy. Se declined for now. LDL < 100     Current diabetic treatment- Mounjaro 2.5mg weekly.  She is compliant with treatment all of the time. Her weight is stable. She is following a diabetic diet.     She has lost 12 lbs.   Denies headaches or constipation. The constipation has resolved.   She is taking olmesartan 20mg per day.  Eye exam is not current.     Mary personal or fmhx of MEN, pancreatitis, gastroparesis, MTC.     Hypertension  She reports her blood pressure at doctor's visits was low 90s/60s. She has lost 12 lbs and perhaps that may be causing low bp. She is not checking bp at home.    Pertinent negatives include no anxiety, blurred vision, chest pain, headaches, neck pain, orthopnea, palpitations or shortness of breath.  She is currently on olmesartan 20mg per day.   The current treatment provides significant improvement.     Labs reviewed: normal flp, normal cmp, normal thyroid.        She had cysto done for recurrent uti. Cysto-normal     Recheck labs in 6 months.     Review of Systems   Constitutional: Negative for fever, chills and fatigue. No distress.  Eyes: Negative for pain and visual disturbance.   Respiratory: Negative for cough, chest tightness, shortness of breath and wheezing.    Cardiovascular: Negative for chest pain, palpitations and leg swelling.   HISTORY:  Past Medical History:    Abnormal glucose    Anxiety    Benign essential hypertension    Calculus of kidney    Depression    Diabetes (HCC)    Hypertension    Obesity    Type 2 diabetes mellitus (HCC)    Unspecified essential hypertension      Past Surgical History:   Procedure Laterality Date            Family History   Problem Relation Age of Onset    Hypertension Mother     Cancer Mother     Hypertension Father     Diabetes Father     Heart Disorder Maternal Grandfather     Stroke Maternal Grandfather     Diabetes Paternal Grandmother     Diabetes Paternal Grandfather     Breast Cancer Maternal Aunt 48      Social History     Socioeconomic History    Marital status:    Tobacco Use    Smoking status: Never    Smokeless tobacco: Never   Vaping Use    Vaping status: Never Used   Substance and Sexual Activity    Alcohol use: Not Currently    Drug use: No   Other Topics Concern    Caffeine Concern No    Exercise No    Seat Belt No    Special Diet No    Stress Concern No    Weight Concern No              Objective    Physical Exam:  alert, appears stated age, and cooperative, Speaking in full sentences comfortably, and Normal work of breathing  Respiratory effort: normal , No pursed-lip breathing, speaking in complete sentences  Neuro: Alert/oriented x3, speech is fluent, face symmetric  Psych: Mood is stable, Affect appropriate    Assessment/Plan:  1. Screening mammogram for breast cancer    - [facility] [place] 2D+3D SCREENING BILAT (CPT=77067/81463); Future    2. Primary hypertension  BP shows good control with last BP  of 95/64. Continue lifestyle changes, diet, exercise and weight loss.   3/1/2025: Potassium 4.5; Creatinine 0.82; eGFR-Cr 91  BP Meds: olmesartan Tabs - 5 MG   Today I reduced olmesartan to 10mg per day  Advised to monitor bp at home and notify if> 140/90     - olmesartan 5 MG Oral Tab; Take 2 tablets (10 mg total) by mouth daily.  Dispense: 360 tablet; Refill: 0    3. Type 2 diabetes mellitus without complication, without long-term current use of insulin (HCC)  Diabetes: A1c is 6.8 done 3/1/2025 which shows Excellent control. Continue current meds and lifestyle modification.  Diabetic Medications: DM Meds: metFORMIN ER Tb24 - 500 MG; Mounjaro Soaj - 2.5 MG/0.5ML, 5 MG/0.5ML  Diabetic Complication(s): none  Patient declined statin therapy LDL < 100   Reduced olmesartan dosing to 10mg per day.   Referred to ophtho  Next visit in 3 months for mounjaro dosing.   Today I increased mounjaro to 5mg per week.   She was getting hypoglycemia so she stopped the metformin 2 weeks ago and feels a lot better. Will keep her off metformin     - Ophthalmology Referral - In Network  - Tirzepatide (MOUNJARO) 5 MG/0.5ML Subcutaneous Solution Auto-injector; Inject 5 mg into the skin once a week.  Dispense: 6 mL; Refill: 0      Diagnostic rationale, follow up instructions, and strict precautions/indications for emergent direct evaluation were discussed with the patient. The patient agrees with the plan, and understands to follow up with their primary care physician or other healthcare provider within 48-72 hours for reevaluation for persistent or worsening symptoms.    Patient understands phone evaluation is not a substitute for face-to-face examination or emergency care. Patient advised to go to ER or call 911 for worsening symptoms or acute distress.         Linda Madrigal DO

## 2025-05-23 ENCOUNTER — OFFICE VISIT (OUTPATIENT)
Dept: FAMILY MEDICINE CLINIC | Facility: CLINIC | Age: 45
End: 2025-05-23
Payer: COMMERCIAL

## 2025-05-23 VITALS
HEIGHT: 65 IN | SYSTOLIC BLOOD PRESSURE: 96 MMHG | RESPIRATION RATE: 16 BRPM | TEMPERATURE: 97 F | HEART RATE: 79 BPM | OXYGEN SATURATION: 100 % | BODY MASS INDEX: 41.32 KG/M2 | DIASTOLIC BLOOD PRESSURE: 64 MMHG | WEIGHT: 248 LBS

## 2025-05-23 DIAGNOSIS — E11.42 DIABETIC POLYNEUROPATHY ASSOCIATED WITH TYPE 2 DIABETES MELLITUS (HCC): ICD-10-CM

## 2025-05-23 DIAGNOSIS — R20.2 TINGLING: ICD-10-CM

## 2025-05-23 DIAGNOSIS — E11.9 TYPE 2 DIABETES MELLITUS WITHOUT COMPLICATION, WITHOUT LONG-TERM CURRENT USE OF INSULIN (HCC): Primary | ICD-10-CM

## 2025-05-23 DIAGNOSIS — I10 PRIMARY HYPERTENSION: ICD-10-CM

## 2025-05-23 PROCEDURE — 3008F BODY MASS INDEX DOCD: CPT | Performed by: FAMILY MEDICINE

## 2025-05-23 PROCEDURE — 3074F SYST BP LT 130 MM HG: CPT | Performed by: FAMILY MEDICINE

## 2025-05-23 PROCEDURE — 3078F DIAST BP <80 MM HG: CPT | Performed by: FAMILY MEDICINE

## 2025-05-23 PROCEDURE — 3044F HG A1C LEVEL LT 7.0%: CPT | Performed by: FAMILY MEDICINE

## 2025-05-23 PROCEDURE — 99213 OFFICE O/P EST LOW 20 MIN: CPT | Performed by: FAMILY MEDICINE

## 2025-05-23 PROCEDURE — 3061F NEG MICROALBUMINURIA REV: CPT | Performed by: FAMILY MEDICINE

## 2025-05-23 PROCEDURE — G2211 COMPLEX E/M VISIT ADD ON: HCPCS | Performed by: FAMILY MEDICINE

## 2025-05-23 RX ORDER — OLMESARTAN MEDOXOMIL 5 MG/1
10 TABLET ORAL DAILY
Qty: 360 TABLET | Refills: 0 | Status: SHIPPED | OUTPATIENT
Start: 2025-05-23 | End: 2025-11-19

## 2025-05-23 RX ORDER — TIRZEPATIDE 7.5 MG/.5ML
7.5 INJECTION, SOLUTION SUBCUTANEOUS WEEKLY
Qty: 6 ML | Refills: 0 | Status: SHIPPED | OUTPATIENT
Start: 2025-05-23 | End: 2025-08-21

## 2025-05-23 RX ORDER — GABAPENTIN 100 MG/1
100 CAPSULE ORAL NIGHTLY
Qty: 90 CAPSULE | Refills: 0 | Status: SHIPPED | OUTPATIENT
Start: 2025-05-23 | End: 2025-08-21

## 2025-05-23 NOTE — PROGRESS NOTES
Subjective:   Carmina Ji is a 45 year old female who presents for Tingling (Patient c/o intermittent tingling bilateal lower arms to fingers x one month)       History/Other:   History of Present Illness  Carmina Ji is a 45 year old female with diabetes and hypertension who presents with tingling and loss of sensation in her arms and fingers.  Diabetes   She has type 2 diabetes mellitus.   Last A1c value was 6.8% done 3/1/2025.    She has been on mounjaro- 5mg weekly. She stopped metformin    Pertinent negatives for diabetes include no blurred vision, no chest pain, no fatigue, no foot ulcerations, no polydipsia and no polyuria. Risk factors for coronary artery disease include diabetes mellitus, hypertension and obesity.   Discussed r/b of statin therapy. Se declined for now. LDL < 100   Current diabetic treatment- Mounjaro 2.5mg weekly.  She is compliant with treatment all of the time. Her weight is stable. She is following a diabetic diet.     She is taking olmesartan 20mg per day.  Eye exam is not current.     DM eye exam - due   Mammogram - due         She experiences tingling and a burning sensation in her left arm, which started about a month ago. The sensation began under her arm and has progressed to her fingers, described as 'like little needles'. 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    Her diabetes is managed with Mounjaro, started approximately two to three months ago. Her A1c is 6.8, and her blood sugar was 106 this morning. She has been losing weight, allowing her to reduce her olmesartan dose from 20 mg to 10 mg daily.    No weakness in her hands, and she can still  objects. No swelling, redness, or skin changes noted. No systemic symptoms such as fever, chills, nausea, vomiting, or dizziness. Removing her watch alleviated  some symptoms in her fingers, and Tylenol provides partial relief for the burning sensation.  .   Chief Complaint Reviewed and Verified  Nursing Notes Reviewed and   Verified  Tobacco Reviewed  Allergies Reviewed  Medications Reviewed    Problem List Reviewed  Medical History Reviewed  Surgical History   Reviewed  Family History Reviewed  Social History Reviewed         Lab Results   Component Value Date    A1C 6.8 (H) 03/01/2025    A1C 5.7 (A) 11/07/2023    A1C 5.6 01/02/2023    A1C 6.3 (A) 08/20/2022    A1C 5.4 02/01/2013          Tobacco:  She has never smoked tobacco.    Current Medications[1]           Review of Systems:  Review of Systems   Constitutional: Negative for fever, chills and fatigue. No distress.  Respiratory: Negative for cough, chest tightness, shortness of breath and wheezing.    Cardiovascular: Negative for chest pain, palpitations and leg swelling.   Gastrointestinal: Negative for nausea, vomiting, abdominal pain, diarrhea, blood in stool and abdominal distention.   Genitourinary: Negative for dysuria, hematuria and difficulty urinating.     Objective:   BP 96/64   Pulse 79   Temp 97.2 °F (36.2 °C) (Temporal)   Resp 16   Ht 5' 5\" (1.651 m)   Wt 248 lb (112.5 kg)   LMP 02/20/2025   SpO2 100%   BMI 41.27 kg/m²  Estimated body mass index is 41.27 kg/m² as calculated from the following:    Height as of this encounter: 5' 5\" (1.651 m).    Weight as of this encounter: 248 lb (112.5 kg).  Results  LABS  A1c: 6.8%  Glucose: 106 mg/dL (05/23/2025)     Physical Exam      Bilateral barefoot skin diabetic exam is normal, visualized feet and the appearance is normal.  Bilateral monofilament/sensation of both feet is normal.  Pulsation pedal pulse exam of both lower legs/feet is normal as well.          VITALS: BP- 96/64  MEASUREMENTS: Weight- 244.  GENERAL: Alert, cooperative, well developed, no acute distress.  HEENT: Normocephalic, normal oropharynx, moist mucous membranes.  CHEST:  Clear to auscultation bilaterally, no wheezes, rhonchi, or crackles.  CARDIOVASCULAR: Normal heart rate and rhythm, S1 and S2 normal without murmurs.  ABDOMEN: Soft, non-tender, non-distended, without organomegaly, normal bowel sounds.  EXTREMITIES: No cyanosis or edema.  NEUROLOGICAL: Cranial nerves grossly intact, moves all extremities without gross motor or sensory deficit. Motor strength normal in upper extremities. Mild pain on Finkelstein's test. No tingling or numbness on Phalen's and reverse Phalen's tests. No tenderness in elbows.      Assessment & Plan:   1. Type 2 diabetes mellitus without complication, without long-term current use of insulin (HCC) (Primary)  -     Ophthalmology Referral - In Network  -     Mounjaro; Inject 7.5 mg into the skin once a week.  Dispense: 6 mL; Refill: 0  -     Vitamin B12; Future; Expected date: 05/23/2025  2. Primary hypertension  -     Olmesartan Medoxomil; Take 2 tablets (10 mg total) by mouth daily.  Dispense: 360 tablet; Refill: 0  3. Tingling  -     Vitamin B12; Future; Expected date: 05/23/2025  4. Diabetic polyneuropathy associated with type 2 diabetes mellitus (HCC)  -     Gabapentin; Take 1 capsule (100 mg total) by mouth nightly.  Dispense: 90 capsule; Refill: 0    Assessment & Plan  Diabetic neuropathy  Intermittent burning and tingling in arms, consistent with diabetic neuropathy. Symptoms correlate with Mounjaro initiation but not with poor glycemic control. Gabapentin proposed for treatment.  - Start gabapentin 100 mg at night. Monitor for drowsiness.  - Follow up in 2-3 months to assess symptoms.  - Consider increasing gabapentin if no improvement.  - Continue monitoring blood glucose and A1c.    Type 2 diabetes mellitus without complication  Type 2 diabetes well-managed. A1c 6.8%, fasting glucose 106 mg/dL. Mounjaro effective for weight loss and glycemic control. No insulin needed.  - Continue Mounjaro, increase to 7.5 mg weekly.  - Monitor blood glucose  regularly.  - Refill Mounjaro prescription.    Primary hypertension  Blood pressure well-controlled on olmesartan 10 mg daily. No symptoms of hypotension.  - Continue olmesartan 10 mg daily.  - Monitor blood pressure at home. Consider dose reduction if consistently <100/60 mmHg.  - Refill olmesartan prescription.    General Health Maintenance  Routine health maintenance up to date. Mammogram due.  - Schedule mammogram.  - Continue regular ophthalmology follow-up.  F/u for physical.       No follow-ups on file.        Linda Madrigal DO, 5/22/2025, 9:55 PM           The following individual(s) verbally consented to be recorded using ambient AI listening technology and understand that they can each withdraw their consent to this listening technology at any point by asking the clinician to turn off or pause the recording:    Patient name: Carmina Ji    Linda Madrigal DO        This note was prepared using Dragon Medical voice recognition dictation software. As a result errors may occur. When identified these errors have been corrected. While every attempt is made to correct errors during dictation discrepancies may still exist.      Note to patient: The 21st Century Cures Act makes medical notes like these available to patients in the interest of transparency. However, be advised this is a medical document. It is intended as peer to peer communication. It is written in medical language and may contain abbreviations or verbiage that are unfamiliar. It may appear blunt or direct. Medical documents are intended to carry relevant information, facts as evident, and the clinical opinion of the practitioner.               [1]   Current Outpatient Medications   Medication Sig Dispense Refill    olmesartan 5 MG Oral Tab Take 2 tablets (10 mg total) by mouth daily. 360 tablet 0    gabapentin 100 MG Oral Cap Take 1 capsule (100 mg total) by mouth nightly. 90 capsule 0    Tirzepatide (MOUNJARO) 7.5 MG/0.5ML Subcutaneous  Solution Auto-injector Inject 7.5 mg into the skin once a week. 6 mL 0    Estradiol 10 % Does not apply Cream       PROGESTERONE OR Take by mouth. cream      Glucose Blood (ONETOUCH ULTRA) In Vitro Strip USE 1 STRIP TO CHECK GLUCOSE ONCE DAILY 100 each 0    Glucose Blood In Vitro Strip Test blood sugar 2 times daily. Diagnosis: E11.9.      EPINEPHrine (EPIPEN) 0.3 MG/0.3ML Injection Device Inject as directed for angioedema reaction with symptoms 1 Device 0

## 2025-06-11 ENCOUNTER — PATIENT MESSAGE (OUTPATIENT)
Dept: FAMILY MEDICINE CLINIC | Facility: CLINIC | Age: 45
End: 2025-06-11

## 2025-06-11 ENCOUNTER — LAB ENCOUNTER (OUTPATIENT)
Dept: LAB | Age: 45
End: 2025-06-11
Attending: FAMILY MEDICINE
Payer: COMMERCIAL

## 2025-06-11 DIAGNOSIS — R20.2 TINGLING: ICD-10-CM

## 2025-06-11 DIAGNOSIS — E11.9 TYPE 2 DIABETES MELLITUS WITHOUT COMPLICATION, WITHOUT LONG-TERM CURRENT USE OF INSULIN (HCC): ICD-10-CM

## 2025-06-11 LAB — VIT B12 SERPL-MCNC: 390 PG/ML (ref 211–911)

## 2025-06-11 PROCEDURE — 82607 VITAMIN B-12: CPT

## 2025-06-11 PROCEDURE — 36415 COLL VENOUS BLD VENIPUNCTURE: CPT

## 2025-06-13 ENCOUNTER — HOSPITAL ENCOUNTER (OUTPATIENT)
Dept: MAMMOGRAPHY | Age: 45
Discharge: HOME | End: 2025-06-13
Attending: FAMILY MEDICINE
Payer: COMMERCIAL

## 2025-06-13 DIAGNOSIS — Z12.31 SCREENING MAMMOGRAM FOR BREAST CANCER: ICD-10-CM

## 2025-06-13 PROCEDURE — 77063 BREAST TOMOSYNTHESIS BI: CPT | Performed by: FAMILY MEDICINE

## 2025-06-13 PROCEDURE — 77067 SCR MAMMO BI INCL CAD: CPT | Performed by: FAMILY MEDICINE

## 2025-08-04 ENCOUNTER — OFFICE VISIT (OUTPATIENT)
Dept: FAMILY MEDICINE CLINIC | Facility: CLINIC | Age: 45
End: 2025-08-04

## 2025-08-04 VITALS
WEIGHT: 242.81 LBS | HEART RATE: 74 BPM | TEMPERATURE: 99 F | RESPIRATION RATE: 18 BRPM | DIASTOLIC BLOOD PRESSURE: 94 MMHG | OXYGEN SATURATION: 98 % | BODY MASS INDEX: 40 KG/M2 | SYSTOLIC BLOOD PRESSURE: 122 MMHG

## 2025-08-04 DIAGNOSIS — M54.50 ACUTE BILATERAL LOW BACK PAIN WITHOUT SCIATICA: Primary | ICD-10-CM

## 2025-08-04 PROCEDURE — 99213 OFFICE O/P EST LOW 20 MIN: CPT

## 2025-08-04 PROCEDURE — 3074F SYST BP LT 130 MM HG: CPT

## 2025-08-04 PROCEDURE — 3080F DIAST BP >= 90 MM HG: CPT

## 2025-08-04 RX ORDER — CYCLOBENZAPRINE HCL 10 MG
10 TABLET ORAL 3 TIMES DAILY PRN
Qty: 21 TABLET | Refills: 0 | Status: SHIPPED | OUTPATIENT
Start: 2025-08-04 | End: 2025-08-11

## 2025-08-11 ENCOUNTER — OFFICE VISIT (OUTPATIENT)
Dept: FAMILY MEDICINE CLINIC | Facility: CLINIC | Age: 45
End: 2025-08-11

## 2025-08-11 VITALS
OXYGEN SATURATION: 98 % | DIASTOLIC BLOOD PRESSURE: 82 MMHG | RESPIRATION RATE: 24 BRPM | TEMPERATURE: 99 F | SYSTOLIC BLOOD PRESSURE: 128 MMHG | BODY MASS INDEX: 41 KG/M2 | WEIGHT: 243.63 LBS | HEART RATE: 89 BPM

## 2025-08-11 DIAGNOSIS — Z02.1 PHYSICAL EXAM, PRE-EMPLOYMENT: Primary | ICD-10-CM

## 2025-08-13 ENCOUNTER — OFFICE VISIT (OUTPATIENT)
Dept: FAMILY MEDICINE CLINIC | Facility: CLINIC | Age: 45
End: 2025-08-13

## 2025-08-13 DIAGNOSIS — Z11.1 ENCOUNTER FOR PPD SKIN TEST READING: Primary | ICD-10-CM

## 2025-08-13 LAB — INDURATION (): 0 MM (ref 0–11)

## (undated) NOTE — Clinical Note
Down #16 lbs her first f/u Sincerely, Denis Thomas, LACEY Odell, FNP-BC Obesity [facility] [address]  [address] [facility], [place], [id number] [facility] Nieves Becker. [facility]